# Patient Record
Sex: MALE | Race: WHITE | NOT HISPANIC OR LATINO | URBAN - METROPOLITAN AREA
[De-identification: names, ages, dates, MRNs, and addresses within clinical notes are randomized per-mention and may not be internally consistent; named-entity substitution may affect disease eponyms.]

---

## 2017-01-08 DIAGNOSIS — F20.0 SCHIZOPHRENIA, PARANOID TYPE (HCC): ICD-10-CM

## 2017-01-09 RX ORDER — BUPROPION HYDROCHLORIDE 75 MG/1
TABLET ORAL
Qty: 180 TABLET | Refills: 0 | Status: SHIPPED | OUTPATIENT
Start: 2017-01-09 | End: 2017-03-14 | Stop reason: SDUPTHER

## 2017-02-15 DIAGNOSIS — F20.0 SCHIZOPHRENIA, PARANOID TYPE (HCC): Primary | ICD-10-CM

## 2017-02-15 DIAGNOSIS — E66.1 MORBID DRUG-INDUCED OBESITY: ICD-10-CM

## 2017-02-15 DIAGNOSIS — I15.2 HYPERTENSION DUE TO ENDOCRINE DISORDER: ICD-10-CM

## 2017-02-16 ENCOUNTER — LAB (OUTPATIENT)
Dept: FAMILY MEDICINE CLINIC | Facility: CLINIC | Age: 37
End: 2017-02-16

## 2017-02-16 ENCOUNTER — RESULTS ENCOUNTER (OUTPATIENT)
Dept: FAMILY MEDICINE CLINIC | Facility: CLINIC | Age: 37
End: 2017-02-16

## 2017-02-16 DIAGNOSIS — F20.0 SCHIZOPHRENIA, PARANOID TYPE (HCC): ICD-10-CM

## 2017-02-16 DIAGNOSIS — I15.2 HYPERTENSION DUE TO ENDOCRINE DISORDER: ICD-10-CM

## 2017-02-17 LAB
ALBUMIN SERPL-MCNC: 4.2 G/DL (ref 3.5–5.2)
ALBUMIN/GLOB SERPL: 1.8 G/DL
ALP SERPL-CCNC: 63 U/L (ref 40–129)
ALT SERPL-CCNC: 21 U/L (ref 5–41)
AMPHETAMINES UR QL SCN: NEGATIVE NG/ML
APPEARANCE UR: CLEAR
AST SERPL-CCNC: 20 U/L (ref 5–40)
BARBITURATES UR QL SCN: NEGATIVE NG/ML
BENZODIAZ UR QL: NEGATIVE NG/ML
BILIRUB SERPL-MCNC: 0.2 MG/DL (ref 0.2–1.2)
BILIRUB UR QL STRIP: NEGATIVE
BUN SERPL-MCNC: 9 MG/DL (ref 6–20)
BUN/CREAT SERPL: 9.1 (ref 7–25)
BZE UR QL: NEGATIVE NG/ML
CALCIUM SERPL-MCNC: 9.7 MG/DL (ref 8.6–10.5)
CANNABINOIDS UR QL SCN: NEGATIVE NG/ML
CHLORIDE SERPL-SCNC: 99 MMOL/L (ref 98–107)
CHOLEST SERPL-MCNC: 159 MG/DL (ref 0–200)
CO2 SERPL-SCNC: 29.1 MMOL/L (ref 22–29)
COLOR UR: YELLOW
CREAT SERPL-MCNC: 0.99 MG/DL (ref 0.76–1.27)
ERYTHROCYTE [DISTWIDTH] IN BLOOD BY AUTOMATED COUNT: 12.2 % (ref 11.5–14.5)
GLOBULIN SER CALC-MCNC: 2.3 GM/DL
GLUCOSE SERPL-MCNC: 94 MG/DL (ref 65–99)
GLUCOSE UR QL: NEGATIVE
HCT VFR BLD AUTO: 46 % (ref 42–52)
HDLC SERPL-MCNC: 31 MG/DL (ref 40–60)
HGB BLD-MCNC: 15.1 G/DL (ref 14–18)
HGB UR QL STRIP: NEGATIVE
KETONES UR QL STRIP: NEGATIVE
LDLC SERPL CALC-MCNC: 80 MG/DL (ref 0–100)
LEUKOCYTE ESTERASE UR QL STRIP: NEGATIVE
MCH RBC QN AUTO: 27.8 PG (ref 27–31)
MCHC RBC AUTO-ENTMCNC: 32.8 G/DL (ref 31–37)
MCV RBC AUTO: 84.7 FL (ref 80–94)
NITRITE UR QL STRIP: NEGATIVE
OPIATES UR QL: NEGATIVE NG/ML
PCP UR QL: NEGATIVE NG/ML
PH UR STRIP: 7 [PH] (ref 4.5–8)
PLATELET # BLD AUTO: 343 10*3/MM3 (ref 140–500)
POTASSIUM SERPL-SCNC: 4.1 MMOL/L (ref 3.5–5.2)
PROT SERPL-MCNC: 6.5 G/DL (ref 6–8.5)
PROT UR QL STRIP: NEGATIVE
RBC # BLD AUTO: 5.43 10*6/MM3 (ref 4.7–6.1)
SODIUM SERPL-SCNC: 139 MMOL/L (ref 136–145)
SP GR UR: 1.01 (ref 1–1.03)
TRIGL SERPL-MCNC: 241 MG/DL (ref 0–150)
TSH SERPL DL<=0.005 MIU/L-ACNC: 1.48 MIU/ML (ref 0.27–4.2)
UROBILINOGEN UR STRIP-MCNC: NORMAL MG/DL
VLDLC SERPL CALC-MCNC: 48.2 MG/DL (ref 8–32)
WBC # BLD AUTO: 9.04 10*3/MM3 (ref 4.8–10.8)

## 2017-03-14 ENCOUNTER — OFFICE VISIT (OUTPATIENT)
Dept: FAMILY MEDICINE CLINIC | Facility: CLINIC | Age: 37
End: 2017-03-14

## 2017-03-14 VITALS
HEIGHT: 72 IN | WEIGHT: 303 LBS | OXYGEN SATURATION: 97 % | HEART RATE: 97 BPM | SYSTOLIC BLOOD PRESSURE: 102 MMHG | DIASTOLIC BLOOD PRESSURE: 74 MMHG | BODY MASS INDEX: 41.04 KG/M2 | TEMPERATURE: 98.2 F

## 2017-03-14 DIAGNOSIS — J06.9 ACUTE URI: ICD-10-CM

## 2017-03-14 DIAGNOSIS — F20.0 SCHIZOPHRENIA, PARANOID TYPE (HCC): Primary | ICD-10-CM

## 2017-03-14 PROCEDURE — 99213 OFFICE O/P EST LOW 20 MIN: CPT | Performed by: FAMILY MEDICINE

## 2017-03-14 RX ORDER — HYDROXYZINE 50 MG/1
25 TABLET, FILM COATED ORAL NIGHTLY PRN
Qty: 30 TABLET | Refills: 5 | Status: SHIPPED | OUTPATIENT
Start: 2017-03-14 | End: 2017-10-20 | Stop reason: SDUPTHER

## 2017-03-14 RX ORDER — AMOXICILLIN 500 MG/1
500 TABLET, FILM COATED ORAL 3 TIMES DAILY
Qty: 21 TABLET | Refills: 0 | Status: SHIPPED | OUTPATIENT
Start: 2017-03-14 | End: 2017-03-21

## 2017-03-14 RX ORDER — RISPERIDONE 4 MG/1
4 TABLET ORAL 2 TIMES DAILY
Qty: 60 TABLET | Refills: 5 | Status: SHIPPED | OUTPATIENT
Start: 2017-03-14 | End: 2017-08-28 | Stop reason: SDUPTHER

## 2017-03-14 RX ORDER — TRAZODONE HYDROCHLORIDE 100 MG/1
100 TABLET ORAL NIGHTLY
Qty: 30 TABLET | Refills: 5 | Status: SHIPPED | OUTPATIENT
Start: 2017-03-14 | End: 2017-11-07 | Stop reason: SDUPTHER

## 2017-03-14 RX ORDER — BUPROPION HYDROCHLORIDE 75 MG/1
75 TABLET ORAL 2 TIMES DAILY
Qty: 60 TABLET | Refills: 5 | Status: SHIPPED | OUTPATIENT
Start: 2017-03-14 | End: 2017-08-30 | Stop reason: SDUPTHER

## 2017-03-14 NOTE — PROGRESS NOTES
Subjective   Gibson Molina is a 36 y.o. male who is here for   Chief Complaint   Patient presents with   • Follow-up   • Nasal Congestion     x 4 days   .     History of Present Illness   Schizophrenia is stable  Lives with mother  No SI or HI  Meds ok  Not sleeping    Uri for last few weeks    The following portions of the patient's history were reviewed and updated as appropriate: allergies, current medications, past family history, past medical history, past social history, past surgical history and problem list.    Review of Systems    Objective   Physical Exam   Constitutional: He appears well-developed and well-nourished. No distress.   HENT:   Mouth/Throat: Oropharynx is clear and moist.   Cardiovascular: Normal rate.    Pulmonary/Chest: Effort normal.   Skin: He is not diaphoretic.   Psychiatric: His affect is labile. His speech is delayed. He is slowed. He expresses inappropriate judgment.   Nursing note and vitals reviewed.  cerumen impactions  Odd affect    Assessment/Plan   Gibson was seen today for follow-up and nasal congestion.    Diagnoses and all orders for this visit:    Schizophrenia, paranoid type  -     buPROPion (WELLBUTRIN) 75 MG tablet; Take 1 tablet by mouth 2 (Two) Times a Day.  -     hydrOXYzine (ATARAX) 50 MG tablet; Take 0.5 tablets by mouth At Night As Needed for Anxiety.  -     risperiDONE (risperDAL) 4 MG tablet; Take 1 tablet by mouth 2 (Two) Times a Day. Indications: Schizophrenia  -     traZODone (DESYREL) 100 MG tablet; Take 1 tablet by mouth Every Night. Indications: Trouble Sleeping  -     Ambulatory Referral to Psychiatry    Acute URI  -     amoxicillin (AMOXIL) 500 MG tablet; Take 1 tablet by mouth 3 (Three) Times a Day for 7 days.      Patient Instructions   S/w his mother  His insomia is most likely due to polypharmacy  So stop the atarax at night          Medications Discontinued During This Encounter   Medication Reason   • buPROPion (WELLBUTRIN) 75 MG tablet  Reorder   • hydrOXYzine (ATARAX) 50 MG tablet Reorder   • risperiDONE (RisperDAL) 4 MG tablet Reorder   • traZODone (DESYREL) 100 MG tablet Reorder        No Follow-up on file.    Dr. Jadiel Hawley  [place], [place]

## 2017-05-03 DIAGNOSIS — F20.0 SCHIZOPHRENIA, PARANOID TYPE (HCC): ICD-10-CM

## 2017-05-04 RX ORDER — TRAZODONE HYDROCHLORIDE 100 MG/1
TABLET ORAL
Qty: 90 TABLET | Refills: 1 | Status: SHIPPED | OUTPATIENT
Start: 2017-05-04 | End: 2017-11-07

## 2017-08-04 DIAGNOSIS — I15.2 HYPERTENSION DUE TO ENDOCRINE DISORDER: ICD-10-CM

## 2017-08-04 RX ORDER — HYDROCHLOROTHIAZIDE 50 MG/1
TABLET ORAL
Qty: 90 TABLET | Refills: 0 | Status: SHIPPED | OUTPATIENT
Start: 2017-08-04 | End: 2017-10-20 | Stop reason: SDUPTHER

## 2017-08-28 DIAGNOSIS — F20.0 SCHIZOPHRENIA, PARANOID TYPE (HCC): ICD-10-CM

## 2017-08-28 RX ORDER — RISPERIDONE 4 MG/1
TABLET ORAL
Qty: 60 TABLET | Refills: 0 | Status: SHIPPED | OUTPATIENT
Start: 2017-08-28 | End: 2017-08-30 | Stop reason: SDUPTHER

## 2017-08-30 DIAGNOSIS — F20.0 SCHIZOPHRENIA, PARANOID TYPE (HCC): ICD-10-CM

## 2017-08-30 RX ORDER — RISPERIDONE 4 MG/1
TABLET ORAL
Qty: 60 TABLET | Refills: 0 | Status: SHIPPED | OUTPATIENT
Start: 2017-08-30 | End: 2017-11-07 | Stop reason: SDUPTHER

## 2017-08-30 RX ORDER — BUPROPION HYDROCHLORIDE 75 MG/1
TABLET ORAL
Qty: 60 TABLET | Refills: 0 | Status: SHIPPED | OUTPATIENT
Start: 2017-08-30 | End: 2017-11-07 | Stop reason: SDUPTHER

## 2017-09-02 DIAGNOSIS — F20.0 SCHIZOPHRENIA, PARANOID TYPE (HCC): ICD-10-CM

## 2017-09-05 RX ORDER — BENZTROPINE MESYLATE 1 MG/1
TABLET ORAL
Qty: 180 TABLET | Refills: 0 | Status: SHIPPED | OUTPATIENT
Start: 2017-09-05 | End: 2017-09-15 | Stop reason: SDUPTHER

## 2017-09-14 ENCOUNTER — TELEPHONE (OUTPATIENT)
Dept: FAMILY MEDICINE CLINIC | Facility: CLINIC | Age: 37
End: 2017-09-14

## 2017-09-15 DIAGNOSIS — F20.0 SCHIZOPHRENIA, PARANOID TYPE (HCC): ICD-10-CM

## 2017-09-15 RX ORDER — BENZTROPINE MESYLATE 1 MG/1
1 TABLET ORAL 2 TIMES DAILY
Qty: 60 TABLET | Refills: 0 | Status: SHIPPED | OUTPATIENT
Start: 2017-09-15 | End: 2017-11-07 | Stop reason: SDUPTHER

## 2017-10-13 DIAGNOSIS — F20.0 SCHIZOPHRENIA, PARANOID TYPE (HCC): ICD-10-CM

## 2017-10-13 RX ORDER — BENZTROPINE MESYLATE 1 MG/1
TABLET ORAL
Qty: 60 TABLET | Refills: 0 | Status: SHIPPED | OUTPATIENT
Start: 2017-10-13 | End: 2017-11-07

## 2017-10-19 DIAGNOSIS — F20.0 SCHIZOPHRENIA, PARANOID TYPE (HCC): ICD-10-CM

## 2017-10-20 ENCOUNTER — TELEPHONE (OUTPATIENT)
Dept: FAMILY MEDICINE CLINIC | Facility: CLINIC | Age: 37
End: 2017-10-20

## 2017-10-20 DIAGNOSIS — F20.0 SCHIZOPHRENIA, PARANOID TYPE (HCC): ICD-10-CM

## 2017-10-20 DIAGNOSIS — I15.2 HYPERTENSION DUE TO ENDOCRINE DISORDER: ICD-10-CM

## 2017-10-20 RX ORDER — HYDROXYZINE 50 MG/1
TABLET, FILM COATED ORAL
Qty: 30 TABLET | Refills: 0 | Status: SHIPPED | OUTPATIENT
Start: 2017-10-20 | End: 2017-11-07 | Stop reason: SDUPTHER

## 2017-10-20 RX ORDER — BUPROPION HYDROCHLORIDE 75 MG/1
TABLET ORAL
Qty: 60 TABLET | Refills: 0 | Status: SHIPPED | OUTPATIENT
Start: 2017-10-20 | End: 2017-11-07

## 2017-10-20 RX ORDER — HYDROCHLOROTHIAZIDE 50 MG/1
TABLET ORAL
Qty: 90 TABLET | Refills: 0 | Status: SHIPPED | OUTPATIENT
Start: 2017-10-20 | End: 2017-11-07 | Stop reason: SDUPTHER

## 2017-10-20 RX ORDER — RISPERIDONE 4 MG/1
TABLET ORAL
Qty: 60 TABLET | Refills: 0 | Status: SHIPPED | OUTPATIENT
Start: 2017-10-20 | End: 2017-11-07

## 2017-10-20 NOTE — TELEPHONE ENCOUNTER
Call pharm to ok 1 month on all meds, ok early  Call his mother to make sure he comes in Nov for f/u  Has was a no show.

## 2017-11-07 ENCOUNTER — OFFICE VISIT (OUTPATIENT)
Dept: FAMILY MEDICINE CLINIC | Facility: CLINIC | Age: 37
End: 2017-11-07

## 2017-11-07 VITALS
HEIGHT: 72 IN | DIASTOLIC BLOOD PRESSURE: 70 MMHG | TEMPERATURE: 97.7 F | HEART RATE: 86 BPM | WEIGHT: 295 LBS | SYSTOLIC BLOOD PRESSURE: 108 MMHG | BODY MASS INDEX: 39.96 KG/M2 | OXYGEN SATURATION: 93 %

## 2017-11-07 DIAGNOSIS — F20.0 SCHIZOPHRENIA, PARANOID TYPE (HCC): ICD-10-CM

## 2017-11-07 DIAGNOSIS — Z00.00 ROUTINE ADULT HEALTH MAINTENANCE: ICD-10-CM

## 2017-11-07 DIAGNOSIS — Z23 NEED FOR INFLUENZA VACCINATION: Primary | ICD-10-CM

## 2017-11-07 DIAGNOSIS — I15.2 HYPERTENSION DUE TO ENDOCRINE DISORDER: ICD-10-CM

## 2017-11-07 PROCEDURE — 90686 IIV4 VACC NO PRSV 0.5 ML IM: CPT | Performed by: FAMILY MEDICINE

## 2017-11-07 PROCEDURE — 99214 OFFICE O/P EST MOD 30 MIN: CPT | Performed by: FAMILY MEDICINE

## 2017-11-07 PROCEDURE — 90471 IMMUNIZATION ADMIN: CPT | Performed by: FAMILY MEDICINE

## 2017-11-07 RX ORDER — TRAZODONE HYDROCHLORIDE 100 MG/1
100 TABLET ORAL NIGHTLY
Qty: 30 TABLET | Refills: 5 | Status: SHIPPED | OUTPATIENT
Start: 2017-11-07 | End: 2018-04-27 | Stop reason: SDUPTHER

## 2017-11-07 RX ORDER — RISPERIDONE 4 MG/1
4 TABLET ORAL 2 TIMES DAILY
Qty: 60 TABLET | Refills: 5 | Status: SHIPPED | OUTPATIENT
Start: 2017-11-07 | End: 2018-05-18 | Stop reason: SDUPTHER

## 2017-11-07 RX ORDER — BENZTROPINE MESYLATE 1 MG/1
1 TABLET ORAL 2 TIMES DAILY
Qty: 60 TABLET | Refills: 5 | Status: SHIPPED | OUTPATIENT
Start: 2017-11-07 | End: 2018-04-21 | Stop reason: SDUPTHER

## 2017-11-07 RX ORDER — HYDROCHLOROTHIAZIDE 50 MG/1
50 TABLET ORAL DAILY
Qty: 90 TABLET | Refills: 3 | Status: SHIPPED | OUTPATIENT
Start: 2017-11-07 | End: 2018-04-23 | Stop reason: SDUPTHER

## 2017-11-07 RX ORDER — HYDROXYZINE 50 MG/1
50 TABLET, FILM COATED ORAL EVERY 6 HOURS PRN
Qty: 30 TABLET | Refills: 5 | Status: SHIPPED | OUTPATIENT
Start: 2017-11-07 | End: 2018-07-12 | Stop reason: SDUPTHER

## 2017-11-07 RX ORDER — BUPROPION HYDROCHLORIDE 75 MG/1
75 TABLET ORAL 2 TIMES DAILY
Qty: 60 TABLET | Refills: 5 | Status: SHIPPED | OUTPATIENT
Start: 2017-11-07 | End: 2018-05-14 | Stop reason: SDUPTHER

## 2017-11-07 NOTE — PATIENT INSTRUCTIONS
Results for orders placed or performed in visit on 02/16/17   Lipid Panel   Result Value Ref Range    Total Cholesterol 159 0 - 200 mg/dL    Triglycerides 241 (H) 0 - 150 mg/dL    HDL Cholesterol 31 (L) 40 - 60 mg/dL    VLDL Cholesterol 48.2 (H) 8 - 32 mg/dL    LDL Cholesterol  80 0 - 100 mg/dL   CBC (No Diff)   Result Value Ref Range    WBC 9.04 4.80 - 10.80 10*3/mm3    RBC 5.43 4.70 - 6.10 10*6/mm3    Hemoglobin 15.1 14.0 - 18.0 g/dL    Hematocrit 46.0 42.0 - 52.0 %    MCV 84.7 80.0 - 94.0 fL    MCH 27.8 27.0 - 31.0 pg    MCHC 32.8 31.0 - 37.0 g/dL    RDW 12.2 11.5 - 14.5 %    Platelets 343 140 - 500 10*3/mm3   Comprehensive Metabolic Panel   Result Value Ref Range    Glucose 94 65 - 99 mg/dL    BUN 9 6 - 20 mg/dL    Creatinine 0.99 0.76 - 1.27 mg/dL    eGFR Non African Am 87 >60 mL/min/1.73    eGFR African Am 105 >60 mL/min/1.73    BUN/Creatinine Ratio 9.1 7.0 - 25.0    Sodium 139 136 - 145 mmol/L    Potassium 4.1 3.5 - 5.2 mmol/L    Chloride 99 98 - 107 mmol/L    Total CO2 29.1 (H) 22.0 - 29.0 mmol/L    Calcium 9.7 8.6 - 10.5 mg/dL    Total Protein 6.5 6.0 - 8.5 g/dL    Albumin 4.20 3.50 - 5.20 g/dL    Globulin 2.3 gm/dL    A/G Ratio 1.8 g/dL    Total Bilirubin 0.2 0.2 - 1.2 mg/dL    Alkaline Phosphatase 63 40 - 129 U/L    AST (SGOT) 20 5 - 40 U/L    ALT (SGPT) 21 5 - 41 U/L   TSH   Result Value Ref Range    TSH 1.480 0.270 - 4.200 mIU/mL   Urinalysis With / Microscopic If Indicated   Result Value Ref Range    Specific Gravity, UA 1.015 1.003 - 1.030    pH, UA 7.0 4.5 - 8.0    Color, UA Yellow     Appearance, UA Clear Clear    Leukocytes, UA Negative Negative    Protein Negative Negative    Glucose, UA Negative Negative    Ketones Negative     Blood, UA Negative Negative    Bilirubin, UA Negative Negative    Urobilinogen, UA Comment     Nitrite, UA Negative Negative   [id number] Drug-Unbund   Result Value Ref Range    Amphetamine, Urine Qual Negative Agrngf=6809 ng/mL    Barbiturates Screen, Urine Negative  Mydnkt=502 ng/mL    Benzodiazepine Screen, Urine Negative Ylqyzo=170 ng/mL    THC Screen, Urine Negative Cutoff=50 ng/mL    Cocaine Screen, Urine Negative Kloioc=528 ng/mL    Opiate Screen, Urine Negative Qdtgix=467 ng/mL    Phencyclidine (PCP), Urine Negative Cutoff=25 ng/mL

## 2017-11-07 NOTE — PROGRESS NOTES
Subjective   Gibson Molina is a 37 y.o. male who is here for   Chief Complaint   Patient presents with   • Follow-up   • Anxiety   • Depression   • Schizophrenia   .     History of Present Illness   Depression: Patient complains of depression. He complains of anhedonia, depressed mood, difficulty concentrating, fatigue, impaired memory and psychomotor agitation. Onset was approximately several years ago, unchanged since that time.  He denies current suicidal and homicidal plan or intent.   Family history significant for no psychiatric illness.Possible organic causes contributing are: drug abuse.  Risk factors: none Previous treatment includes Wellbutrin and risperdal and group therapy, individual therapy and medication. He complains of the following side effects from the treatment: dry mouth and weight gain.  Schizophrenia has been stable  Lives with parents, mother and step father  Stays at home most of time.  No night [place]  No voices.  No SI nor HI    The following portions of the patient's history were reviewed and updated as appropriate: allergies, current medications, past family history, past medical history, past social history, past surgical history and problem list.    Review of Systems    Objective   Physical Exam   Constitutional: No distress.   Cardiovascular: Normal rate.    Pulmonary/Chest: Effort normal.   Neurological: He is alert.   Psychiatric: His mood appears anxious. His speech is slurred. He is hyperactive. He is not actively hallucinating. Thought content is paranoid. Cognition and memory are impaired. He expresses impulsivity and inappropriate judgment.   Nursing note and vitals reviewed.      Assessment/Plan   Gibson was seen today for follow-up, anxiety, depression and schizophrenia.    Diagnoses and all orders for this visit:    Need for influenza vaccination  -     Flu Vaccine Quad PF 3YR+    Schizophrenia, paranoid type  -     benztropine (COGENTIN) 1 MG tablet; Take 1 tablet by  mouth 2 (Two) Times a Day. Indications: Extrapyramidal Reaction caused by Medications  -     buPROPion (WELLBUTRIN) 75 MG tablet; Take 1 tablet by mouth 2 (Two) Times a Day. Indications: Depressive Phase of Manic-Depression  -     hydrOXYzine (ATARAX) 50 MG tablet; Take 1 tablet by mouth Every 6 (Six) Hours As Needed for Anxiety.  -     risperiDONE (risperDAL) 4 MG tablet; Take 1 tablet by mouth 2 (Two) Times a Day. Indications: Schizophrenia  -     traZODone (DESYREL) 100 MG tablet; Take 1 tablet by mouth Every Night. Indications: Trouble Sleeping  -     Lipid Panel; Future  -     CBC (No Diff); Future  -     Comprehensive Metabolic Panel; Future  -     TSH; Future  -     Urine Drug Screen - Urine, Clean Catch; Future  -     Hemoglobin A1c; Future    Hypertension due to endocrine disorder  -     hydrochlorothiazide (HYDRODIURIL) 50 MG tablet; Take 1 tablet by mouth Daily.  -     Lipid Panel; Future  -     CBC (No Diff); Future  -     Comprehensive Metabolic Panel; Future  -     TSH; Future  -     Urine Drug Screen - Urine, Clean Catch; Future  -     Hemoglobin A1c; Future    Routine adult health maintenance  -     Lipid Panel; Future  -     CBC (No Diff); Future  -     Comprehensive Metabolic Panel; Future  -     TSH; Future  -     Urine Drug Screen - Urine, Clean Catch; Future  -     Hemoglobin A1c; Future      Patient Instructions     Results for orders placed or performed in visit on 02/16/17   Lipid Panel   Result Value Ref Range    Total Cholesterol 159 0 - 200 mg/dL    Triglycerides 241 (H) 0 - 150 mg/dL    HDL Cholesterol 31 (L) 40 - 60 mg/dL    VLDL Cholesterol 48.2 (H) 8 - 32 mg/dL    LDL Cholesterol  80 0 - 100 mg/dL   CBC (No Diff)   Result Value Ref Range    WBC 9.04 4.80 - 10.80 10*3/mm3    RBC 5.43 4.70 - 6.10 10*6/mm3    Hemoglobin 15.1 14.0 - 18.0 g/dL    Hematocrit 46.0 42.0 - 52.0 %    MCV 84.7 80.0 - 94.0 fL    MCH 27.8 27.0 - 31.0 pg    MCHC 32.8 31.0 - 37.0 g/dL    RDW 12.2 11.5 - 14.5 %     Platelets 343 140 - 500 10*3/mm3   Comprehensive Metabolic Panel   Result Value Ref Range    Glucose 94 65 - 99 mg/dL    BUN 9 6 - 20 mg/dL    Creatinine 0.99 0.76 - 1.27 mg/dL    eGFR Non African Am 87 >60 mL/min/1.73    eGFR African Am 105 >60 mL/min/1.73    BUN/Creatinine Ratio 9.1 7.0 - 25.0    Sodium 139 136 - 145 mmol/L    Potassium 4.1 3.5 - 5.2 mmol/L    Chloride 99 98 - 107 mmol/L    Total CO2 29.1 (H) 22.0 - 29.0 mmol/L    Calcium 9.7 8.6 - 10.5 mg/dL    Total Protein 6.5 6.0 - 8.5 g/dL    Albumin 4.20 3.50 - 5.20 g/dL    Globulin 2.3 gm/dL    A/G Ratio 1.8 g/dL    Total Bilirubin 0.2 0.2 - 1.2 mg/dL    Alkaline Phosphatase 63 40 - 129 U/L    AST (SGOT) 20 5 - 40 U/L    ALT (SGPT) 21 5 - 41 U/L   TSH   Result Value Ref Range    TSH 1.480 0.270 - 4.200 mIU/mL   Urinalysis With / Microscopic If Indicated   Result Value Ref Range    Specific Gravity, UA 1.015 1.003 - 1.030    pH, UA 7.0 4.5 - 8.0    Color, UA Yellow     Appearance, UA Clear Clear    Leukocytes, UA Negative Negative    Protein Negative Negative    Glucose, UA Negative Negative    Ketones Negative     Blood, UA Negative Negative    Bilirubin, UA Negative Negative    Urobilinogen, UA Comment     Nitrite, UA Negative Negative   [id number] Drug-Unbund   Result Value Ref Range    Amphetamine, Urine Qual Negative Ljsdmv=0317 ng/mL    Barbiturates Screen, Urine Negative Ugewuh=916 ng/mL    Benzodiazepine Screen, Urine Negative Wibzvx=001 ng/mL    THC Screen, Urine Negative Cutoff=50 ng/mL    Cocaine Screen, Urine Negative Eovrau=190 ng/mL    Opiate Screen, Urine Negative Pqxvbf=401 ng/mL    Phencyclidine (PCP), Urine Negative Cutoff=25 ng/mL           Medications Discontinued During This Encounter   Medication Reason   • benztropine (COGENTIN) 1 MG tablet Therapy completed   • buPROPion (WELLBUTRIN) 75 MG tablet Therapy completed   • risperiDONE (risperDAL) 4 MG tablet Therapy completed   • traZODone (DESYREL) 100 MG tablet Therapy completed   •  terbinafine (LamiSIL) 250 MG tablet Therapy completed   • benztropine (COGENTIN) 1 MG tablet Reorder   • buPROPion (WELLBUTRIN) 75 MG tablet Reorder   • hydrochlorothiazide (HYDRODIURIL) 50 MG tablet Reorder   • hydrOXYzine (ATARAX) 50 MG tablet Reorder   • risperiDONE (risperDAL) 4 MG tablet Reorder   • traZODone (DESYREL) 100 MG tablet Reorder        Return in about 6 months (around 5/7/2018) for Annual physical.    Dr. Jadiel Hawley  [place], [place]

## 2017-11-29 DIAGNOSIS — F20.0 SCHIZOPHRENIA, PARANOID TYPE (HCC): ICD-10-CM

## 2017-11-29 RX ORDER — RISPERIDONE 4 MG/1
TABLET ORAL
Qty: 60 TABLET | Refills: 0 | Status: SHIPPED | OUTPATIENT
Start: 2017-11-29 | End: 2018-06-12 | Stop reason: SDUPTHER

## 2018-01-15 DIAGNOSIS — I15.2 HYPERTENSION DUE TO ENDOCRINE DISORDER: ICD-10-CM

## 2018-01-16 RX ORDER — HYDROCHLOROTHIAZIDE 50 MG/1
TABLET ORAL
Qty: 90 TABLET | Refills: 0 | Status: SHIPPED | OUTPATIENT
Start: 2018-01-16 | End: 2018-07-12 | Stop reason: SDUPTHER

## 2018-02-15 ENCOUNTER — TELEPHONE (OUTPATIENT)
Dept: FAMILY MEDICINE CLINIC | Facility: CLINIC | Age: 38
End: 2018-02-15

## 2018-02-23 DIAGNOSIS — F20.0 SCHIZOPHRENIA, PARANOID TYPE (HCC): ICD-10-CM

## 2018-02-23 RX ORDER — HYDROXYZINE 50 MG/1
TABLET, FILM COATED ORAL
Qty: 30 TABLET | Refills: 0 | Status: SHIPPED | OUTPATIENT
Start: 2018-02-23 | End: 2018-04-02 | Stop reason: SDUPTHER

## 2018-04-02 DIAGNOSIS — F20.0 SCHIZOPHRENIA, PARANOID TYPE (HCC): ICD-10-CM

## 2018-04-03 RX ORDER — HYDROXYZINE 50 MG/1
TABLET, FILM COATED ORAL
Qty: 30 TABLET | Refills: 0 | Status: SHIPPED | OUTPATIENT
Start: 2018-04-03 | End: 2018-07-12 | Stop reason: DRUGHIGH

## 2018-04-07 ENCOUNTER — RESULTS ENCOUNTER (OUTPATIENT)
Dept: FAMILY MEDICINE CLINIC | Facility: CLINIC | Age: 38
End: 2018-04-07

## 2018-04-07 DIAGNOSIS — Z00.00 ROUTINE ADULT HEALTH MAINTENANCE: ICD-10-CM

## 2018-04-07 DIAGNOSIS — I15.2 HYPERTENSION DUE TO ENDOCRINE DISORDER: ICD-10-CM

## 2018-04-07 DIAGNOSIS — F20.0 SCHIZOPHRENIA, PARANOID TYPE (HCC): ICD-10-CM

## 2018-04-21 DIAGNOSIS — F20.0 SCHIZOPHRENIA, PARANOID TYPE (HCC): ICD-10-CM

## 2018-04-23 DIAGNOSIS — I15.2 HYPERTENSION DUE TO ENDOCRINE DISORDER: ICD-10-CM

## 2018-04-23 RX ORDER — HYDROCHLOROTHIAZIDE 50 MG/1
50 TABLET ORAL DAILY
Qty: 90 TABLET | Refills: 0 | Status: SHIPPED | OUTPATIENT
Start: 2018-04-23 | End: 2018-07-12 | Stop reason: SDUPTHER

## 2018-04-23 RX ORDER — BENZTROPINE MESYLATE 1 MG/1
TABLET ORAL
Qty: 60 TABLET | Refills: 0 | Status: SHIPPED | OUTPATIENT
Start: 2018-04-23 | End: 2018-05-26 | Stop reason: SDUPTHER

## 2018-04-27 DIAGNOSIS — F20.0 SCHIZOPHRENIA, PARANOID TYPE (HCC): ICD-10-CM

## 2018-04-27 RX ORDER — TRAZODONE HYDROCHLORIDE 100 MG/1
TABLET ORAL
Qty: 30 TABLET | Refills: 1 | Status: SHIPPED | OUTPATIENT
Start: 2018-04-27 | End: 2018-05-26 | Stop reason: SDUPTHER

## 2018-05-14 DIAGNOSIS — F20.0 SCHIZOPHRENIA, PARANOID TYPE (HCC): ICD-10-CM

## 2018-05-14 RX ORDER — BUPROPION HYDROCHLORIDE 75 MG/1
TABLET ORAL
Qty: 60 TABLET | Refills: 0 | Status: SHIPPED | OUTPATIENT
Start: 2018-05-14 | End: 2018-06-16 | Stop reason: SDUPTHER

## 2018-05-18 DIAGNOSIS — F20.0 SCHIZOPHRENIA, PARANOID TYPE (HCC): ICD-10-CM

## 2018-05-18 RX ORDER — RISPERIDONE 4 MG/1
TABLET ORAL
Qty: 60 TABLET | Refills: 0 | Status: SHIPPED | OUTPATIENT
Start: 2018-05-18 | End: 2018-07-07 | Stop reason: SDUPTHER

## 2018-05-26 DIAGNOSIS — F20.0 SCHIZOPHRENIA, PARANOID TYPE (HCC): ICD-10-CM

## 2018-05-29 RX ORDER — BENZTROPINE MESYLATE 1 MG/1
TABLET ORAL
Qty: 60 TABLET | Refills: 0 | Status: SHIPPED | OUTPATIENT
Start: 2018-05-29 | End: 2018-06-24 | Stop reason: SDUPTHER

## 2018-05-29 RX ORDER — TRAZODONE HYDROCHLORIDE 100 MG/1
TABLET ORAL
Qty: 30 TABLET | Refills: 0 | Status: SHIPPED | OUTPATIENT
Start: 2018-05-29 | End: 2018-07-12 | Stop reason: SDUPTHER

## 2018-06-12 DIAGNOSIS — F20.0 SCHIZOPHRENIA, PARANOID TYPE (HCC): ICD-10-CM

## 2018-06-12 RX ORDER — RISPERIDONE 4 MG/1
TABLET ORAL
Qty: 30 TABLET | Refills: 0 | Status: SHIPPED | OUTPATIENT
Start: 2018-06-12 | End: 2018-07-12 | Stop reason: SDUPTHER

## 2018-06-16 DIAGNOSIS — F20.0 SCHIZOPHRENIA, PARANOID TYPE (HCC): ICD-10-CM

## 2018-06-18 RX ORDER — BUPROPION HYDROCHLORIDE 75 MG/1
TABLET ORAL
Qty: 30 TABLET | Refills: 0 | Status: SHIPPED | OUTPATIENT
Start: 2018-06-18 | End: 2018-07-12

## 2018-06-24 DIAGNOSIS — F20.0 SCHIZOPHRENIA, PARANOID TYPE (HCC): ICD-10-CM

## 2018-06-25 RX ORDER — BENZTROPINE MESYLATE 1 MG/1
TABLET ORAL
Qty: 30 TABLET | Refills: 0 | Status: SHIPPED | OUTPATIENT
Start: 2018-06-25 | End: 2018-07-12 | Stop reason: SDUPTHER

## 2018-07-07 DIAGNOSIS — F20.0 SCHIZOPHRENIA, PARANOID TYPE (HCC): ICD-10-CM

## 2018-07-09 DIAGNOSIS — F20.0 SCHIZOPHRENIA, PARANOID TYPE (HCC): ICD-10-CM

## 2018-07-09 RX ORDER — RISPERIDONE 4 MG/1
TABLET ORAL
Qty: 60 TABLET | Refills: 0 | Status: SHIPPED | OUTPATIENT
Start: 2018-07-09 | End: 2018-07-12 | Stop reason: SDUPTHER

## 2018-07-10 RX ORDER — TRAZODONE HYDROCHLORIDE 100 MG/1
TABLET ORAL
Qty: 30 TABLET | Refills: 0 | Status: SHIPPED | OUTPATIENT
Start: 2018-07-10 | End: 2018-07-12 | Stop reason: SDUPTHER

## 2018-07-12 ENCOUNTER — OFFICE VISIT (OUTPATIENT)
Dept: FAMILY MEDICINE CLINIC | Facility: CLINIC | Age: 38
End: 2018-07-12

## 2018-07-12 VITALS
HEIGHT: 72 IN | WEIGHT: 267 LBS | SYSTOLIC BLOOD PRESSURE: 130 MMHG | DIASTOLIC BLOOD PRESSURE: 80 MMHG | OXYGEN SATURATION: 97 % | BODY MASS INDEX: 36.16 KG/M2 | HEART RATE: 97 BPM

## 2018-07-12 DIAGNOSIS — I15.2 HYPERTENSION DUE TO ENDOCRINE DISORDER: ICD-10-CM

## 2018-07-12 DIAGNOSIS — F20.0 SCHIZOPHRENIA, PARANOID TYPE (HCC): Primary | ICD-10-CM

## 2018-07-12 PROCEDURE — 99214 OFFICE O/P EST MOD 30 MIN: CPT | Performed by: FAMILY MEDICINE

## 2018-07-12 RX ORDER — BENZTROPINE MESYLATE 1 MG/1
1 TABLET ORAL 2 TIMES DAILY
Qty: 60 TABLET | Refills: 11 | Status: SHIPPED | OUTPATIENT
Start: 2018-07-12 | End: 2019-10-28

## 2018-07-12 RX ORDER — HYDROXYZINE 50 MG/1
50 TABLET, FILM COATED ORAL EVERY 6 HOURS PRN
Qty: 30 TABLET | Refills: 5 | Status: SHIPPED | OUTPATIENT
Start: 2018-07-12 | End: 2019-03-11 | Stop reason: SDUPTHER

## 2018-07-12 RX ORDER — HYDROCHLOROTHIAZIDE 50 MG/1
50 TABLET ORAL DAILY
Qty: 90 TABLET | Refills: 3 | Status: SHIPPED | OUTPATIENT
Start: 2018-07-12 | End: 2019-10-28

## 2018-07-12 RX ORDER — RISPERIDONE 4 MG/1
4 TABLET ORAL 2 TIMES DAILY
Qty: 60 TABLET | Refills: 11 | Status: SHIPPED | OUTPATIENT
Start: 2018-07-12 | End: 2019-07-25 | Stop reason: SDUPTHER

## 2018-07-12 RX ORDER — TRAZODONE HYDROCHLORIDE 100 MG/1
100 TABLET ORAL
Qty: 30 TABLET | Refills: 11 | Status: SHIPPED | OUTPATIENT
Start: 2018-07-12 | End: 2018-09-10 | Stop reason: SDUPTHER

## 2018-07-12 NOTE — PROGRESS NOTES
Chief Complaint   Patient presents with   • Follow-up   • Hypertension   • Schizophrenia       Subjective     Patient here for follow-up of elevated blood pressure.    He is not exercising and is adherent to a low-salt diet.    Blood pressure is well controlled at home.   Cardiac symptoms: none.   Patient denies: chest pressure/discomfort, claudication, cough, dyspnea, exertional chest pressure/discomfort, fatigue, irregular heart beat, lower extremity edema, near-syncope, orthopnea, palpitations, paroxysmal nocturnal dyspnea and syncope.   Cardiovascular risk factors: hypertension, male gender and sedentary lifestyle.   Use of agents associated with hypertension: none.   History of target organ damage: none.  Patient is taking prescribed hypertension medications as prescribed without side effects.    Schizophrenia is stable  No idris, no depression,  Sleep is stable  No drugs  No voices  But stress at home his mother has metastatic breast cancer    The following portions of the patient's history were reviewed and updated as appropriate: allergies, current medications, past family history, past medical history, past social history, past surgical history and problem list.    Review of Systems  A comprehensive review of systems was negative except for: Behavioral/Psych: positive for aggressive behavior, bad mood, behavior problems, learning difficulty, mood swings, sleep disturbance and tobacco use    Results for orders placed or performed in visit on 02/16/17   Lipid Panel   Result Value Ref Range    Total Cholesterol 159 0 - 200 mg/dL    Triglycerides 241 (H) 0 - 150 mg/dL    HDL Cholesterol 31 (L) 40 - 60 mg/dL    VLDL Cholesterol 48.2 (H) 8 - 32 mg/dL    LDL Cholesterol  80 0 - 100 mg/dL   CBC (No Diff)   Result Value Ref Range    WBC 9.04 4.80 - 10.80 10*3/mm3    RBC 5.43 4.70 - 6.10 10*6/mm3    Hemoglobin 15.1 14.0 - 18.0 g/dL    Hematocrit 46.0 42.0 - 52.0 %    MCV 84.7 80.0 - 94.0 fL    MCH 27.8 27.0 - 31.0  "pg    MCHC 32.8 31.0 - 37.0 g/dL    RDW 12.2 11.5 - 14.5 %    Platelets 343 140 - 500 10*3/mm3   Comprehensive Metabolic Panel   Result Value Ref Range    Glucose 94 65 - 99 mg/dL    BUN 9 6 - 20 mg/dL    Creatinine 0.99 0.76 - 1.27 mg/dL    eGFR Non African Am 87 >60 mL/min/1.73    eGFR African Am 105 >60 mL/min/1.73    BUN/Creatinine Ratio 9.1 7.0 - 25.0    Sodium 139 136 - 145 mmol/L    Potassium 4.1 3.5 - 5.2 mmol/L    Chloride 99 98 - 107 mmol/L    Total CO2 29.1 (H) 22.0 - 29.0 mmol/L    Calcium 9.7 8.6 - 10.5 mg/dL    Total Protein 6.5 6.0 - 8.5 g/dL    Albumin 4.20 3.50 - 5.20 g/dL    Globulin 2.3 gm/dL    A/G Ratio 1.8 g/dL    Total Bilirubin 0.2 0.2 - 1.2 mg/dL    Alkaline Phosphatase 63 40 - 129 U/L    AST (SGOT) 20 5 - 40 U/L    ALT (SGPT) 21 5 - 41 U/L   TSH   Result Value Ref Range    TSH 1.480 0.270 - 4.200 mIU/mL   Urinalysis With / Microscopic If Indicated   Result Value Ref Range    Specific Gravity, UA 1.015 1.003 - 1.030    pH, UA 7.0 4.5 - 8.0    Color, UA Yellow     Appearance, UA Clear Clear    Leukocytes, UA Negative Negative    Protein Negative Negative    Glucose, UA Negative Negative    Ketones Negative     Blood, UA Negative Negative    Bilirubin, UA Negative Negative    Urobilinogen, UA Comment     Nitrite, UA Negative Negative   [id number] Drug-Unbund   Result Value Ref Range    Amphetamine, Urine Qual Negative Xwxhnh=0786 ng/mL    Barbiturates Screen, Urine Negative Pcakou=736 ng/mL    Benzodiazepine Screen, Urine Negative Wzvpta=337 ng/mL    THC Screen, Urine Negative Cutoff=50 ng/mL    Cocaine Screen, Urine Negative Hdwpjb=888 ng/mL    Opiate Screen, Urine Negative Ovlhlu=584 ng/mL    Phencyclidine (PCP), Urine Negative Cutoff=25 ng/mL        Vitals:    07/12/18 1039   BP: 130/80   BP Location: Left arm   Patient Position: Sitting   Pulse: 97   SpO2: 97%   Weight: 121 kg (267 lb)   Height: 182.9 cm (72\")     Objective    Gen: alert, pleasant.  HEENT: PERRL, EOMI intact, lids ok, ear " canals clear, TMs normal, throat clear, nostrils normal  Neck: no bruit, no enlarged thyroid  Lungs: CTA  Heart: RR no murmur  ABD: soft , + BS  Pulses: intact  Mood: stable     Assessment/Plan   Hypertension, normal blood pressure Evidence of target organ damage: none.    Gibson was seen today for follow-up, hypertension and schizophrenia.    Diagnoses and all orders for this visit:    Schizophrenia, paranoid type (CMS/HCC)  -     benztropine (COGENTIN) 1 MG tablet; Take 1 tablet by mouth 2 (Two) Times a Day.  -     risperiDONE (risperDAL) 4 MG tablet; Take 1 tablet by mouth 2 (Two) Times a Day.  -     traZODone (DESYREL) 100 MG tablet; Take 1 tablet by mouth every night at bedtime.  -     hydrOXYzine (ATARAX) 50 MG tablet; Take 1 tablet by mouth Every 6 (Six) Hours As Needed for Anxiety.    Hypertension due to endocrine disorder  -     hydrochlorothiazide (HYDRODIURIL) 50 MG tablet; Take 1 tablet by mouth Daily.      Medication: no change.  Follow up: 6 months and as needed.    There are no Patient Instructions on file for this visit.  Medications Discontinued During This Encounter   Medication Reason   • traZODone (DESYREL) 100 MG tablet Duplicate order   • risperiDONE (risperDAL) 4 MG tablet Duplicate order   • hydrOXYzine (ATARAX) 50 MG tablet Dose adjustment   • hydrochlorothiazide (HYDRODIURIL) 50 MG tablet Duplicate order   • buPROPion (WELLBUTRIN) 75 MG tablet *Therapy completed   • benztropine (COGENTIN) 1 MG tablet Reorder   • hydrochlorothiazide (HYDRODIURIL) 50 MG tablet Reorder   • risperiDONE (risperDAL) 4 MG tablet Reorder   • traZODone (DESYREL) 100 MG tablet Reorder   • hydrOXYzine (ATARAX) 50 MG tablet Reorder        Return in about 6 months (around 1/12/2019) for blood pressure.    Limit salt    Limit caffeine to 1-2 servings a day    Dr. Jadiel Hawley MD  [place], [place]  [facility].

## 2018-08-05 DIAGNOSIS — F20.0 SCHIZOPHRENIA, PARANOID TYPE (HCC): ICD-10-CM

## 2018-08-06 RX ORDER — TRAZODONE HYDROCHLORIDE 100 MG/1
TABLET ORAL
Qty: 30 TABLET | Refills: 0 | Status: SHIPPED | OUTPATIENT
Start: 2018-08-06 | End: 2018-09-10 | Stop reason: SDUPTHER

## 2018-09-05 LAB
ALBUMIN SERPL-MCNC: 4.2 G/DL (ref 3.5–5.2)
ALBUMIN/GLOB SERPL: 1.8 G/DL
ALP SERPL-CCNC: 56 U/L (ref 40–129)
ALT SERPL-CCNC: 29 U/L (ref 5–41)
AST SERPL-CCNC: 23 U/L (ref 5–40)
BILIRUB SERPL-MCNC: 0.6 MG/DL (ref 0.2–1.2)
BUN SERPL-MCNC: 12 MG/DL (ref 6–20)
BUN/CREAT SERPL: 13.5 (ref 7–25)
CALCIUM SERPL-MCNC: 9.2 MG/DL (ref 8.6–10.5)
CHLORIDE SERPL-SCNC: 100 MMOL/L (ref 98–107)
CHOLEST SERPL-MCNC: 150 MG/DL (ref 0–200)
CO2 SERPL-SCNC: 25.8 MMOL/L (ref 22–29)
CREAT SERPL-MCNC: 0.89 MG/DL (ref 0.76–1.27)
ERYTHROCYTE [DISTWIDTH] IN BLOOD BY AUTOMATED COUNT: 12.1 % (ref 11.5–14.5)
GLOBULIN SER CALC-MCNC: 2.4 GM/DL
GLUCOSE SERPL-MCNC: 102 MG/DL (ref 65–99)
HBA1C MFR BLD: 5.3 % (ref 4.8–5.6)
HCT VFR BLD AUTO: 46.9 % (ref 42–52)
HDLC SERPL-MCNC: 32 MG/DL (ref 40–60)
HGB BLD-MCNC: 15.5 G/DL (ref 14–18)
LDLC SERPL CALC-MCNC: 80 MG/DL (ref 0–100)
MCH RBC QN AUTO: 28.4 PG (ref 27–31)
MCHC RBC AUTO-ENTMCNC: 33 G/DL (ref 31–37)
MCV RBC AUTO: 86.1 FL (ref 80–94)
PLATELET # BLD AUTO: 289 10*3/MM3 (ref 140–500)
POTASSIUM SERPL-SCNC: 4.1 MMOL/L (ref 3.5–5.2)
PROT SERPL-MCNC: 6.6 G/DL (ref 6–8.5)
RBC # BLD AUTO: 5.45 10*6/MM3 (ref 4.7–6.1)
SODIUM SERPL-SCNC: 139 MMOL/L (ref 136–145)
TRIGL SERPL-MCNC: 191 MG/DL (ref 0–150)
TSH SERPL DL<=0.005 MIU/L-ACNC: 1.18 MIU/ML (ref 0.27–4.2)
VLDLC SERPL CALC-MCNC: 38.2 MG/DL (ref 8–32)
WBC # BLD AUTO: 9.96 10*3/MM3 (ref 4.8–10.8)

## 2018-09-10 ENCOUNTER — OFFICE VISIT (OUTPATIENT)
Dept: FAMILY MEDICINE CLINIC | Facility: CLINIC | Age: 38
End: 2018-09-10

## 2018-09-10 VITALS
DIASTOLIC BLOOD PRESSURE: 84 MMHG | BODY MASS INDEX: 38.19 KG/M2 | SYSTOLIC BLOOD PRESSURE: 124 MMHG | HEART RATE: 85 BPM | OXYGEN SATURATION: 96 % | HEIGHT: 72 IN | WEIGHT: 282 LBS

## 2018-09-10 DIAGNOSIS — E66.1 MORBID DRUG-INDUCED OBESITY: ICD-10-CM

## 2018-09-10 DIAGNOSIS — F20.0 SCHIZOPHRENIA, PARANOID TYPE (HCC): ICD-10-CM

## 2018-09-10 DIAGNOSIS — I15.2 HYPERTENSION DUE TO ENDOCRINE DISORDER: Primary | ICD-10-CM

## 2018-09-10 DIAGNOSIS — Z00.00 ROUTINE ADULT HEALTH MAINTENANCE: ICD-10-CM

## 2018-09-10 PROCEDURE — 99395 PREV VISIT EST AGE 18-39: CPT | Performed by: FAMILY MEDICINE

## 2018-09-10 RX ORDER — TRAZODONE HYDROCHLORIDE 100 MG/1
100 TABLET ORAL
Qty: 90 TABLET | Refills: 3 | Status: SHIPPED | OUTPATIENT
Start: 2018-09-10 | End: 2019-09-26 | Stop reason: SDUPTHER

## 2018-09-10 NOTE — PROGRESS NOTES
"  Chief Complaint   Patient presents with   • Annual Exam       Subjective   Gibson Molina is a 38 y.o. male and is here for a yearly physical exam. The patient reports no problems.    Do you take any herbs or supplements that were not prescribed by a doctor? no. If so, these will be added to active medication list.    The following portions of the patient's history were reviewed and updated as appropriate: allergies, current medications, past family history, past medical history, past social history, past surgical history and problem list.    Social and Family and Surgical History reviewed and updated today, see Rooming tab.    Health History, Preventive Measures and Vaccination flow sheets reviewed and updated today.    Patient's current medical chart in Epic; including previous office notes, imaging, labs, specialist's evaluation either in notes or in Media tab reviewed today.    Other pertinent medical information also reviewed thru Care Everywhere function is also reviewed today.    Review of Systems  Review of Systems  A comprehensive review of systems was negative except for: Behavioral/Psych: positive for aggressive behavior, behavior problems, learning difficulty, mood swings, separation anxiety, sleep disturbance and schizophrinia    Vitals:    09/10/18 1056   BP: 124/84   BP Location: Left arm   Patient Position: Sitting   Pulse: 85   SpO2: 96%   Weight: 128 kg (282 lb)   Height: 182.9 cm (72\")       General Appearance:  Alert, cooperative, no distress, appears stated age.  Leg shakes, makes fair eye contact  Denies SI or HI no voices     Head:  Normocephalic, without obvious abnormality, atraumatic   Eyes:  PERRL, conjunctiva/corneas clear, EOM's intact.   Ears:  Normal TM's and external ear canals, both ears   Nose: Nares normal, septum midline, mucosa normal, no drainage or sinus tenderness   Throat: Lips, mucosa, and tongue normal; teeth and gums normal   Neck: Supple, symmetrical, trachea " midline, no adenopathy;   thyroid: No enlargement/tenderness/nodules; no carotid  bruit   Back:  Symmetric, no curvature, ROM normal, no CVA tenderness   Lungs:  Clear to auscultation bilaterally, respirations unlabored   Chest wall:  No tenderness or deformity   Heart:  Regular rate and rhythm, S1 and S2 normal, no murmur, rub or gallop   Abdomen:  Soft, non-tender, bowel sounds active all four quadrants,   no masses, no organomegaly   Rectal:        Extremities: Extremities normal, atraumatic, no cyanosis or edema   Pulses: 2+ and symmetric all extremities   Skin: Skin color, texture, turgor normal, no rashes or lesions   Lymph nodes: Cervical, supraclavicular, normal   Neurologic: CNII-XII intact. Normal strength, sensation and reflexes   throughout          Results for orders placed or performed in visit on 04/07/18   Lipid Panel   Result Value Ref Range    Total Cholesterol 150 0 - 200 mg/dL    Triglycerides 191 (H) 0 - 150 mg/dL    HDL Cholesterol 32 (L) 40 - 60 mg/dL    VLDL Cholesterol 38.2 (H) 8 - 32 mg/dL    LDL Cholesterol  80 0 - 100 mg/dL   CBC (No Diff)   Result Value Ref Range    WBC 9.96 4.80 - 10.80 10*3/mm3    RBC 5.45 4.70 - 6.10 10*6/mm3    Hemoglobin 15.5 14.0 - 18.0 g/dL    Hematocrit 46.9 42.0 - 52.0 %    MCV 86.1 80.0 - 94.0 fL    MCH 28.4 27.0 - 31.0 pg    MCHC 33.0 31.0 - 37.0 g/dL    RDW 12.1 11.5 - 14.5 %    Platelets 289 140 - 500 10*3/mm3   Comprehensive Metabolic Panel   Result Value Ref Range    Glucose 102 (H) 65 - 99 mg/dL    BUN 12 6 - 20 mg/dL    Creatinine 0.89 0.76 - 1.27 mg/dL    eGFR Non African Am 97 >60 mL/min/1.73    eGFR African Am 117 >60 mL/min/1.73    BUN/Creatinine Ratio 13.5 7.0 - 25.0    Sodium 139 136 - 145 mmol/L    Potassium 4.1 3.5 - 5.2 mmol/L    Chloride 100 98 - 107 mmol/L    Total CO2 25.8 22.0 - 29.0 mmol/L    Calcium 9.2 8.6 - 10.5 mg/dL    Total Protein 6.6 6.0 - 8.5 g/dL    Albumin 4.20 3.50 - 5.20 g/dL    Globulin 2.4 gm/dL    A/G Ratio 1.8 g/dL    Total  Bilirubin 0.6 0.2 - 1.2 mg/dL    Alkaline Phosphatase 56 40 - 129 U/L    AST (SGOT) 23 5 - 40 U/L    ALT (SGPT) 29 5 - 41 U/L   TSH   Result Value Ref Range    TSH 1.180 0.270 - 4.200 mIU/mL   Hemoglobin A1c   Result Value Ref Range    Hemoglobin A1C 5.30 4.80 - 5.60 %     Assessment/Plan   Healthy male exam.  Gibson was seen today for annual exam.    Diagnoses and all orders for this visit:    Hypertension due to endocrine disorder    Morbid drug-induced obesity    Routine adult health maintenance    Schizophrenia, paranoid type (CMS/HCC)  -     traZODone (DESYREL) 100 MG tablet; Take 1 tablet by mouth every night at bedtime.      1. Labs ok  Seems behavior is ok  Lives with parents    2. Patient Counseling:  --Nutrition: Stressed importance of moderation in sodium/caffeine intake, saturated fat and cholesterol.  Discussed caloric balance, sufficient intake of fresh fruits, vegetables, fiber, calcium, iron.  -  --Exercise: Stressed the importance of regular exercise.   --Substance Abuse: Discussed cessation/primary prevention of tobacco, alcohol, or other drug use; driving or other dangerous activities under the influence.    --Dental health: Discussed importance of regular tooth brushing, flossing, and dental visits.  -- suggested having eyes and vision checked if needed or past due.  --Immunizations reviewed.  --  3. Discussed the patient's BMI with him.  The BMI is above average; no BMI management plan is appropriate  4. Follow up in 6 months    There are no Patient Instructions on file for this visit.    Medications Discontinued During This Encounter   Medication Reason   • traZODone (DESYREL) 100 MG tablet Duplicate order   • traZODone (DESYREL) 100 MG tablet Reorder        Dr. Jadiel Hawley MD  [place], [place]  [facility]

## 2019-03-11 ENCOUNTER — OFFICE VISIT (OUTPATIENT)
Dept: FAMILY MEDICINE CLINIC | Facility: CLINIC | Age: 39
End: 2019-03-11

## 2019-03-11 VITALS
DIASTOLIC BLOOD PRESSURE: 80 MMHG | BODY MASS INDEX: 36.57 KG/M2 | HEART RATE: 94 BPM | OXYGEN SATURATION: 97 % | SYSTOLIC BLOOD PRESSURE: 118 MMHG | HEIGHT: 72 IN | WEIGHT: 270 LBS

## 2019-03-11 DIAGNOSIS — Z00.00 ROUTINE ADULT HEALTH MAINTENANCE: ICD-10-CM

## 2019-03-11 DIAGNOSIS — F20.0 SCHIZOPHRENIA, PARANOID TYPE (HCC): ICD-10-CM

## 2019-03-11 DIAGNOSIS — I15.2 HYPERTENSION DUE TO ENDOCRINE DISORDER: Primary | ICD-10-CM

## 2019-03-11 DIAGNOSIS — E66.1 MORBID DRUG-INDUCED OBESITY: ICD-10-CM

## 2019-03-11 DIAGNOSIS — Z79.899 HIGH RISK MEDICATION USE: ICD-10-CM

## 2019-03-11 PROCEDURE — 99213 OFFICE O/P EST LOW 20 MIN: CPT | Performed by: FAMILY MEDICINE

## 2019-03-11 RX ORDER — HYDROXYZINE 50 MG/1
50 TABLET, FILM COATED ORAL EVERY 6 HOURS PRN
Qty: 30 TABLET | Refills: 5 | Status: SHIPPED | OUTPATIENT
Start: 2019-03-11 | End: 2021-05-11 | Stop reason: SDUPTHER

## 2019-03-11 NOTE — PROGRESS NOTES
"  Chief Complaint   Patient presents with   • Follow-up   • Hypertension       Subjective     Patient here for follow-up of elevated blood pressure.    He is not exercising and is not adherent to a low-salt diet.    Blood pressure is well controlled at home.   Cardiac symptoms: none.   Patient denies: chest pain.   Cardiovascular risk factors: hypertension, male gender, obesity (BMI >= 30 kg/m2) and sedentary lifestyle.   Use of agents associated with hypertension: none.   History of target organ damage: none.  Patient is taking prescribed hypertension medications as prescribed without side effects.    He is stressed, his mother who he relies on for most everything was in hospital and now is in rehab unit.    Reports sleeping too much        The following portions of the patient's history were reviewed and updated as appropriate: allergies, current medications, past family history, past medical history, past social history, past surgical history and problem list.    Review of Systems  A comprehensive review of systems was negative except for: Behavioral/Psych: positive for behavior problems, learning difficulty, mood swings, separation anxiety, sleep disturbance and tobacco use         Vitals:    03/11/19 1051   BP: 118/80   BP Location: Left arm   Patient Position: Sitting   Pulse: 94   SpO2: 97%   Weight: 122 kg (270 lb)   Height: 182.9 cm (72\")     BP Readings from Last 3 Encounters:   03/11/19 118/80   09/10/18 124/84   07/12/18 130/80     Objective      Gen: alert, pleasant.  HEENT: PERRL, EOMI intact, lids ok, ear canals clear, TMs normal, throat clear, nostrils normal  Neck: no bruit, no enlarged thyroid  Lungs: CTA  Heart: RR no murmur  ABD: soft , + BS  Pulses: intact  Mood: stable, blunt affect     Assessment/Plan   Hypertension, normal blood pressure Evidence of target organ damage: none.    Gibson was seen today for follow-up and hypertension.    Diagnoses and all orders for this visit:    Hypertension " due to endocrine disorder  -     Lipid Panel; Future  -     CBC (No Diff); Future  -     Comprehensive Metabolic Panel; Future  -     TSH; Future  -     Hemoglobin A1c; Future    Morbid drug-induced obesity    Routine adult health maintenance  -     Lipid Panel; Future  -     CBC (No Diff); Future  -     Comprehensive Metabolic Panel; Future  -     TSH; Future  -     Hemoglobin A1c; Future    Schizophrenia, paranoid type (CMS/HCC)  -     hydrOXYzine (ATARAX) 50 MG tablet; Take 1 tablet by mouth Every 6 (Six) Hours As Needed for Anxiety.    High risk medication use  -     Lipid Panel; Future  -     CBC (No Diff); Future  -     Comprehensive Metabolic Panel; Future  -     TSH; Future  -     Hemoglobin A1c; Future      Medication: no change.  Follow up: 6 months and as needed.    There are no Patient Instructions on file for this visit.  Medications Discontinued During This Encounter   Medication Reason   • hydrOXYzine (ATARAX) 50 MG tablet Reorder        Return in about 6 months (around 9/11/2019).    Limit salt  Limit alcoholic drinks to 1 a day  Limit caffeine to 1-2 servings a day    Dr. Jadiel Hawley MD  [place], [place]  [facility].

## 2019-07-25 DIAGNOSIS — F20.0 SCHIZOPHRENIA, PARANOID TYPE (HCC): ICD-10-CM

## 2019-07-25 RX ORDER — RISPERIDONE 4 MG/1
TABLET ORAL
Qty: 60 TABLET | Refills: 0 | Status: SHIPPED | OUTPATIENT
Start: 2019-07-25 | End: 2019-08-23 | Stop reason: SDUPTHER

## 2019-08-11 ENCOUNTER — RESULTS ENCOUNTER (OUTPATIENT)
Dept: FAMILY MEDICINE CLINIC | Facility: CLINIC | Age: 39
End: 2019-08-11

## 2019-08-11 DIAGNOSIS — Z79.899 HIGH RISK MEDICATION USE: ICD-10-CM

## 2019-08-11 DIAGNOSIS — I15.2 HYPERTENSION DUE TO ENDOCRINE DISORDER: ICD-10-CM

## 2019-08-11 DIAGNOSIS — Z00.00 ROUTINE ADULT HEALTH MAINTENANCE: ICD-10-CM

## 2019-08-23 DIAGNOSIS — F20.0 SCHIZOPHRENIA, PARANOID TYPE (HCC): ICD-10-CM

## 2019-08-23 RX ORDER — RISPERIDONE 4 MG/1
TABLET ORAL
Qty: 60 TABLET | Refills: 0 | Status: SHIPPED | OUTPATIENT
Start: 2019-08-23 | End: 2019-09-26 | Stop reason: SDUPTHER

## 2019-09-26 DIAGNOSIS — F20.0 SCHIZOPHRENIA, PARANOID TYPE (HCC): ICD-10-CM

## 2019-09-26 RX ORDER — TRAZODONE HYDROCHLORIDE 100 MG/1
100 TABLET ORAL
Qty: 30 TABLET | Refills: 0 | Status: SHIPPED | OUTPATIENT
Start: 2019-09-26 | End: 2019-10-28 | Stop reason: SDUPTHER

## 2019-09-26 RX ORDER — RISPERIDONE 4 MG/1
TABLET ORAL
Qty: 60 TABLET | Refills: 0 | Status: SHIPPED | OUTPATIENT
Start: 2019-09-26 | End: 2019-10-28 | Stop reason: SDUPTHER

## 2019-10-23 DIAGNOSIS — F20.0 SCHIZOPHRENIA, PARANOID TYPE (HCC): ICD-10-CM

## 2019-10-23 RX ORDER — RISPERIDONE 4 MG/1
TABLET ORAL
Qty: 60 TABLET | Refills: 0 | OUTPATIENT
Start: 2019-10-23

## 2019-10-23 RX ORDER — TRAZODONE HYDROCHLORIDE 100 MG/1
100 TABLET ORAL
Qty: 30 TABLET | Refills: 0 | OUTPATIENT
Start: 2019-10-23

## 2019-10-28 ENCOUNTER — OFFICE VISIT (OUTPATIENT)
Dept: FAMILY MEDICINE CLINIC | Facility: CLINIC | Age: 39
End: 2019-10-28

## 2019-10-28 VITALS
WEIGHT: 249 LBS | OXYGEN SATURATION: 99 % | SYSTOLIC BLOOD PRESSURE: 120 MMHG | HEART RATE: 94 BPM | DIASTOLIC BLOOD PRESSURE: 84 MMHG | BODY MASS INDEX: 33.72 KG/M2 | HEIGHT: 72 IN

## 2019-10-28 DIAGNOSIS — F20.0 SCHIZOPHRENIA, PARANOID TYPE (HCC): Primary | ICD-10-CM

## 2019-10-28 PROCEDURE — 99213 OFFICE O/P EST LOW 20 MIN: CPT | Performed by: FAMILY MEDICINE

## 2019-10-28 RX ORDER — TRAZODONE HYDROCHLORIDE 100 MG/1
100 TABLET ORAL
Qty: 30 TABLET | Refills: 5 | Status: SHIPPED | OUTPATIENT
Start: 2019-10-28 | End: 2020-04-27

## 2019-10-28 RX ORDER — RISPERIDONE 4 MG/1
4 TABLET ORAL 2 TIMES DAILY
Qty: 60 TABLET | Refills: 5 | Status: SHIPPED | OUTPATIENT
Start: 2019-10-28 | End: 2020-04-29

## 2019-10-28 NOTE — PROGRESS NOTES
Subjective   Gibson Molina is a 39 y.o. male who is here for   Chief Complaint   Patient presents with   • Med Refill     all medicines    • Schizoaffective Disorder   .     History of Present Illness   Not doing well  His mother  of breast cancer  She was his care taker  Still living with his step dad.  I s/w his step dad    The following portions of the patient's history were reviewed and updated as appropriate: allergies, current medications, past family history, past medical history, past social history, past surgical history and problem list.    Review of Systems    Objective   Physical Exam   Constitutional: He appears well-developed and well-nourished.   Cardiovascular: Normal rate.   Pulmonary/Chest: Effort normal.   Psychiatric: His affect is blunt. His speech is delayed. He is slowed. Thought content is paranoid. He expresses inappropriate judgment.   Nursing note and vitals reviewed.      Assessment/Plan   Gibson was seen today for med refill and schizoaffective disorder.    Diagnoses and all orders for this visit:    Schizophrenia, paranoid type (CMS/HCC)  -     risperiDONE (risperDAL) 4 MG tablet; Take 1 tablet by mouth 2 (Two) Times a Day.  -     traZODone (DESYREL) 100 MG tablet; Take 1 tablet by mouth every night at bedtime.    needs refills    There are no Patient Instructions on file for this visit.    Medications Discontinued During This Encounter   Medication Reason   • hydrochlorothiazide (HYDRODIURIL) 50 MG tablet *Therapy completed   • benztropine (COGENTIN) 1 MG tablet *Therapy completed   • risperiDONE (risperDAL) 4 MG tablet Reorder   • traZODone (DESYREL) 100 MG tablet Reorder        Return in about 6 months (around 2020).    Dr. Jadiel Hawley  [place], [place]

## 2020-04-27 DIAGNOSIS — F20.0 SCHIZOPHRENIA, PARANOID TYPE (HCC): ICD-10-CM

## 2020-04-27 RX ORDER — TRAZODONE HYDROCHLORIDE 100 MG/1
100 TABLET ORAL
Qty: 30 TABLET | Refills: 5 | Status: SHIPPED | OUTPATIENT
Start: 2020-04-27 | End: 2020-04-29

## 2020-04-29 DIAGNOSIS — F20.0 SCHIZOPHRENIA, PARANOID TYPE (HCC): ICD-10-CM

## 2020-04-29 RX ORDER — TRAZODONE HYDROCHLORIDE 100 MG/1
100 TABLET ORAL
Qty: 30 TABLET | Refills: 5 | Status: SHIPPED | OUTPATIENT
Start: 2020-04-29 | End: 2021-05-11 | Stop reason: SDUPTHER

## 2020-04-29 RX ORDER — RISPERIDONE 4 MG/1
TABLET ORAL
Qty: 60 TABLET | Refills: 5 | Status: SHIPPED | OUTPATIENT
Start: 2020-04-29 | End: 2020-06-08

## 2020-06-07 DIAGNOSIS — F20.0 SCHIZOPHRENIA, PARANOID TYPE (HCC): ICD-10-CM

## 2020-06-08 RX ORDER — RISPERIDONE 4 MG/1
TABLET ORAL
Qty: 60 TABLET | Refills: 5 | Status: SHIPPED | OUTPATIENT
Start: 2020-06-08 | End: 2021-05-11 | Stop reason: SDUPTHER

## 2021-05-06 DIAGNOSIS — F20.0 SCHIZOPHRENIA, PARANOID TYPE (HCC): ICD-10-CM

## 2021-05-06 RX ORDER — RISPERIDONE 4 MG/1
TABLET ORAL
Qty: 60 TABLET | Refills: 5 | OUTPATIENT
Start: 2021-05-06

## 2021-05-10 DIAGNOSIS — F20.0 SCHIZOPHRENIA, PARANOID TYPE (HCC): ICD-10-CM

## 2021-05-10 RX ORDER — RISPERIDONE 4 MG/1
TABLET ORAL
Qty: 60 TABLET | Refills: 5 | OUTPATIENT
Start: 2021-05-10

## 2021-05-11 ENCOUNTER — OFFICE VISIT (OUTPATIENT)
Dept: FAMILY MEDICINE CLINIC | Facility: CLINIC | Age: 41
End: 2021-05-11

## 2021-05-11 VITALS
WEIGHT: 260 LBS | TEMPERATURE: 97.8 F | DIASTOLIC BLOOD PRESSURE: 84 MMHG | HEART RATE: 87 BPM | SYSTOLIC BLOOD PRESSURE: 148 MMHG | OXYGEN SATURATION: 100 % | HEIGHT: 72 IN | BODY MASS INDEX: 35.21 KG/M2

## 2021-05-11 DIAGNOSIS — F20.0 SCHIZOPHRENIA, PARANOID TYPE (HCC): ICD-10-CM

## 2021-05-11 PROCEDURE — 99213 OFFICE O/P EST LOW 20 MIN: CPT | Performed by: FAMILY MEDICINE

## 2021-05-11 RX ORDER — TRAZODONE HYDROCHLORIDE 100 MG/1
100 TABLET ORAL
Qty: 30 TABLET | Refills: 5 | Status: SHIPPED | OUTPATIENT
Start: 2021-05-11 | End: 2021-11-09 | Stop reason: SDUPTHER

## 2021-05-11 RX ORDER — HYDROXYZINE 50 MG/1
50 TABLET, FILM COATED ORAL EVERY 6 HOURS PRN
Qty: 30 TABLET | Refills: 5 | Status: SHIPPED | OUTPATIENT
Start: 2021-05-11 | End: 2021-11-09 | Stop reason: SDUPTHER

## 2021-05-11 RX ORDER — RISPERIDONE 4 MG/1
4 TABLET ORAL 2 TIMES DAILY
Qty: 60 TABLET | Refills: 5 | Status: SHIPPED | OUTPATIENT
Start: 2021-05-11 | End: 2021-11-09 | Stop reason: SDUPTHER

## 2021-05-11 NOTE — PROGRESS NOTES
"  Subjective   Gibson Molina is a 41 y.o. male who is here for   Chief Complaint   Patient presents with   • Hypertension   • Schizophrenia   .     History of Present Illness   Sukhdev comes in for follow-up.  Sukhdev's been lost to follow-up for about a year and a half for a multitude of issues.  Mostly being COVID-19.  His stepfather brought him in today.  Sukhdev reports he is doing okay.  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  Doing well  Sleeping okay.  Does not report bad dreams.  Not hearing voices.  Is out of his medications due for refills.  Reports he received his COVID-19 vaccine, documentation is pending    The following portions of the patient's history were reviewed and updated as appropriate: allergies, current medications, past family history, past medical history, past social history, past surgical history and problem list.    Review of Systems    Objective   Vitals:    05/11/21 1243   BP: 148/84   BP Location: Left arm   Patient Position: Sitting   Cuff Size: Adult   Pulse: 87   Temp: 97.8 °F (36.6 °C)   TempSrc: Temporal   SpO2: 100%   Weight: 118 kg (260 lb)   Height: 182.9 cm (72\")      Physical Exam  Neurological:      Mental Status: He is alert.   Psychiatric:         Mood and Affect: Affect is flat.         Speech: Speech is delayed.         Behavior: Behavior is slowed.         Cognition and Memory: Cognition is impaired. Memory is impaired.         Assessment/Plan   Diagnoses and all orders for this visit:    1. Schizophrenia, paranoid type (CMS/HCC)  -     risperiDONE (risperDAL) 4 MG tablet; Take 1 tablet by mouth 2 (Two) Times a Day.  Dispense: 60 tablet; Refill: 5  -     traZODone (DESYREL) 100 MG tablet; Take 1 tablet by mouth every night at bedtime. Indications: Aggressive Behavior, Trouble Sleeping  Dispense: 30 tablet; Refill: 5  -     hydrOXYzine (ATARAX) 50 MG tablet; Take 1 tablet by mouth Every 6 (Six) Hours As Needed for Anxiety.  Dispense: 30 tablet; Refill: 5    Restart " his medications and asked him come back in 6 months  There are no Patient Instructions on file for this visit.    Medications Discontinued During This Encounter   Medication Reason   • traZODone (DESYREL) 100 MG tablet Reorder   • risperiDONE (risperDAL) 4 MG tablet Reorder   • hydrOXYzine (ATARAX) 50 MG tablet Reorder        Return in about 6 months (around 11/11/2021).    Dr. Jadiel Hawley  [place], [place]

## 2021-09-09 DIAGNOSIS — F20.0 SCHIZOPHRENIA, PARANOID TYPE (HCC): ICD-10-CM

## 2021-09-10 RX ORDER — TRAZODONE HYDROCHLORIDE 100 MG/1
TABLET ORAL
Qty: 30 TABLET | Refills: 5 | OUTPATIENT
Start: 2021-09-10

## 2021-11-09 ENCOUNTER — OFFICE VISIT (OUTPATIENT)
Dept: FAMILY MEDICINE CLINIC | Facility: CLINIC | Age: 41
End: 2021-11-09

## 2021-11-09 VITALS
WEIGHT: 266 LBS | OXYGEN SATURATION: 99 % | HEART RATE: 86 BPM | DIASTOLIC BLOOD PRESSURE: 80 MMHG | SYSTOLIC BLOOD PRESSURE: 122 MMHG | HEIGHT: 72 IN | BODY MASS INDEX: 36.03 KG/M2 | TEMPERATURE: 97.3 F

## 2021-11-09 DIAGNOSIS — F20.0 SCHIZOPHRENIA, PARANOID TYPE (HCC): ICD-10-CM

## 2021-11-09 PROCEDURE — 99213 OFFICE O/P EST LOW 20 MIN: CPT | Performed by: FAMILY MEDICINE

## 2021-11-09 RX ORDER — HYDROXYZINE 50 MG/1
50 TABLET, FILM COATED ORAL
Qty: 90 TABLET | Refills: 1 | Status: SHIPPED | OUTPATIENT
Start: 2021-11-09 | End: 2022-05-02 | Stop reason: SDUPTHER

## 2021-11-09 RX ORDER — TRAZODONE HYDROCHLORIDE 100 MG/1
100 TABLET ORAL
Qty: 30 TABLET | Refills: 5 | Status: SHIPPED | OUTPATIENT
Start: 2021-11-09 | End: 2022-05-02 | Stop reason: SDUPTHER

## 2021-11-09 RX ORDER — RISPERIDONE 4 MG/1
4 TABLET ORAL 2 TIMES DAILY
Qty: 60 TABLET | Refills: 5 | Status: SHIPPED | OUTPATIENT
Start: 2021-11-09 | End: 2022-05-02 | Stop reason: SDUPTHER

## 2021-11-09 NOTE — PROGRESS NOTES
"  Subjective   Gibson Molina is a 41 y.o. male who is here for   Chief Complaint   Patient presents with   • Schizophrenia     medication f/u    .     History of Present Illness   Sukhdev is here in follow-up for his schizophrenia medications.  No reports from family.  Note that his mother  2 to 3 years ago from breast cancer.  No contact with his father.  Lives with his stepfather.  He has 1 brother who is in halfway for bank robbery  Has little contact with his other sister.  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      The following portions of the patient's history were reviewed and updated as appropriate: allergies, current medications, past family history, past medical history, past social history, past surgical history and problem list.    Review of Systems    Objective   Vitals:    21 0843   BP: 122/80   BP Location: Left arm   Patient Position: Sitting   Cuff Size: Large Adult   Pulse: 86   Temp: 97.3 °F (36.3 °C)   TempSrc: Temporal   SpO2: 99%   Weight: 121 kg (266 lb)   Height: 182.9 cm (72\")      Physical Exam  Cardiovascular:      Rate and Rhythm: Normal rate.   Neurological:      Mental Status: He is alert.   Psychiatric:         Mood and Affect: Affect is flat.         Assessment/Plan   Diagnoses and all orders for this visit:    1. Schizophrenia, paranoid type (HCC)  -     hydrOXYzine (ATARAX) 50 MG tablet; Take 1 tablet by mouth every night at bedtime. Indications: Feeling Anxious  Dispense: 90 tablet; Refill: 1  -     risperiDONE (risperDAL) 4 MG tablet; Take 1 tablet by mouth 2 (Two) Times a Day.  Dispense: 60 tablet; Refill: 5  -     traZODone (DESYREL) 100 MG tablet; Take 1 tablet by mouth every night at bedtime. Indications: Aggressive Behavior, Trouble Sleeping  Dispense: 30 tablet; Refill: 5    Will refill same medications.  We will see him back in 6 months    There are no Patient Instructions on file for this " visit.    Medications Discontinued During This Encounter   Medication Reason   • risperiDONE (risperDAL) 4 MG tablet Reorder   • traZODone (DESYREL) 100 MG tablet Reorder   • hydrOXYzine (ATARAX) 50 MG tablet Reorder        No follow-ups on file.    Dr. Jadiel Hawley  [place], [place]

## 2022-05-02 ENCOUNTER — OFFICE VISIT (OUTPATIENT)
Dept: FAMILY MEDICINE CLINIC | Facility: CLINIC | Age: 42
End: 2022-05-02

## 2022-05-02 VITALS
WEIGHT: 280 LBS | TEMPERATURE: 97.8 F | HEIGHT: 72 IN | SYSTOLIC BLOOD PRESSURE: 128 MMHG | HEART RATE: 88 BPM | OXYGEN SATURATION: 99 % | DIASTOLIC BLOOD PRESSURE: 88 MMHG | BODY MASS INDEX: 37.93 KG/M2

## 2022-05-02 DIAGNOSIS — F20.0 SCHIZOPHRENIA, PARANOID TYPE: ICD-10-CM

## 2022-05-02 PROCEDURE — 99213 OFFICE O/P EST LOW 20 MIN: CPT | Performed by: FAMILY MEDICINE

## 2022-05-02 RX ORDER — RISPERIDONE 4 MG/1
4 TABLET ORAL 2 TIMES DAILY
Qty: 60 TABLET | Refills: 5 | Status: SHIPPED | OUTPATIENT
Start: 2022-05-02 | End: 2022-10-21 | Stop reason: SDUPTHER

## 2022-05-02 RX ORDER — HYDROXYZINE 50 MG/1
50 TABLET, FILM COATED ORAL
Qty: 90 TABLET | Refills: 1 | Status: SHIPPED | OUTPATIENT
Start: 2022-05-02 | End: 2022-10-21 | Stop reason: SDUPTHER

## 2022-05-02 RX ORDER — TRAZODONE HYDROCHLORIDE 100 MG/1
100 TABLET ORAL
Qty: 30 TABLET | Refills: 5 | Status: SHIPPED | OUTPATIENT
Start: 2022-05-02 | End: 2022-10-21 | Stop reason: SDUPTHER

## 2022-05-02 NOTE — PROGRESS NOTES
"  Subjective   Gibson Molina is a 42 y.o. male who is here for   Chief Complaint   Patient presents with   • Schizophrenia     Medications follow up    .     History of Present Illness   Patient comes in for her usual 6-month medication check.  Longstanding schizophrenia.  Remains at home living with family.  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  Takes the hydroxyzine and trazodone for sleep at night  Seems to be tolerating the respite all well.      The following portions of the patient's history were reviewed and updated as appropriate: allergies, current medications, past family history, past medical history, past social history, past surgical history and problem list.    Review of Systems    Objective   Vitals:    05/02/22 1524   BP: 128/88   BP Location: Left arm   Patient Position: Sitting   Cuff Size: Large Adult   Pulse: 88   Temp: 97.8 °F (36.6 °C)   SpO2: 99%   Weight: 127 kg (280 lb)   Height: 182.9 cm (72\")      Physical Exam  Vitals reviewed.   Neurological:      Mental Status: He is alert.   Psychiatric:         Mood and Affect: Affect is blunt.         Assessment/Plan   Diagnoses and all orders for this visit:    1. Schizophrenia, paranoid type (HCC)  -     hydrOXYzine (ATARAX) 50 MG tablet; Take 1 tablet by mouth every night at bedtime. Indications: Feeling Anxious  Dispense: 90 tablet; Refill: 1  -     risperiDONE (risperDAL) 4 MG tablet; Take 1 tablet by mouth 2 (Two) Times a Day.  Dispense: 60 tablet; Refill: 5  -     traZODone (DESYREL) 100 MG tablet; Take 1 tablet by mouth every night at bedtime. Indications: Aggressive Behavior, Trouble Sleeping  Dispense: 30 tablet; Refill: 5    Refill medications 6 months.  There are no Patient Instructions on file for this visit.    Medications Discontinued During This Encounter   Medication Reason   • hydrOXYzine (ATARAX) 50 MG tablet Reorder   • risperiDONE (risperDAL) 4 MG tablet Reorder   • traZODone (DESYREL) 100 MG tablet Reorder        No " follow-ups on file.    Dr. Jadiel Hawley  [place], [place]

## 2022-10-21 ENCOUNTER — OFFICE VISIT (OUTPATIENT)
Dept: FAMILY MEDICINE CLINIC | Facility: CLINIC | Age: 42
End: 2022-10-21

## 2022-10-21 VITALS
SYSTOLIC BLOOD PRESSURE: 130 MMHG | TEMPERATURE: 98.9 F | OXYGEN SATURATION: 94 % | DIASTOLIC BLOOD PRESSURE: 80 MMHG | WEIGHT: 265 LBS | BODY MASS INDEX: 35.89 KG/M2 | HEIGHT: 72 IN | HEART RATE: 114 BPM

## 2022-10-21 DIAGNOSIS — F20.0 SCHIZOPHRENIA, PARANOID TYPE: ICD-10-CM

## 2022-10-21 PROCEDURE — 99213 OFFICE O/P EST LOW 20 MIN: CPT | Performed by: FAMILY MEDICINE

## 2022-10-21 RX ORDER — RISPERIDONE 4 MG/1
4 TABLET ORAL 2 TIMES DAILY
Qty: 60 TABLET | Refills: 5 | Status: SHIPPED | OUTPATIENT
Start: 2022-10-21

## 2022-10-21 RX ORDER — TRAZODONE HYDROCHLORIDE 100 MG/1
100 TABLET ORAL
Qty: 30 TABLET | Refills: 5 | Status: SHIPPED | OUTPATIENT
Start: 2022-10-21

## 2022-10-21 RX ORDER — HYDROXYZINE 50 MG/1
50 TABLET, FILM COATED ORAL
Qty: 90 TABLET | Refills: 1 | Status: SHIPPED | OUTPATIENT
Start: 2022-10-21

## 2022-10-21 NOTE — PROGRESS NOTES
"  Subjective   Gibson Molina is a 42 y.o. male who is here for   Chief Complaint   Patient presents with   • Schizophrenia     Med f/u    .     History of Present Illness   Mood is stable  Stays home  Sleeping ok  Watches TV  No pain attacks  No voices    The following portions of the patient's history were reviewed and updated as appropriate: allergies, current medications, past family history, past medical history, past social history, past surgical history and problem list.    Review of Systems    Objective   Vitals:    10/21/22 1447   BP: 130/80   BP Location: Left arm   Patient Position: Sitting   Cuff Size: Large Adult   Pulse: 114   Temp: 98.9 °F (37.2 °C)   SpO2: 94%   Weight: 120 kg (265 lb)   Height: 182.9 cm (72\")      Physical Exam  Vitals reviewed.   Cardiovascular:      Rate and Rhythm: Normal rate.   Pulmonary:      Effort: Pulmonary effort is normal.   Neurological:      Mental Status: He is alert.   Psychiatric:         Mood and Affect: Affect is blunt and flat.         Speech: Speech is delayed.         Behavior: Behavior is slowed.         Assessment & Plan   Diagnoses and all orders for this visit:    1. Schizophrenia, paranoid type (HCC)  -     hydrOXYzine (ATARAX) 50 MG tablet; Take 1 tablet by mouth every night at bedtime. Indications: Feeling Anxious  Dispense: 90 tablet; Refill: 1  -     risperiDONE (risperDAL) 4 MG tablet; Take 1 tablet by mouth 2 (Two) Times a Day.  Dispense: 60 tablet; Refill: 5  -     traZODone (DESYREL) 100 MG tablet; Take 1 tablet by mouth every night at bedtime. Indications: Aggressive Behavior, Trouble Sleeping  Dispense: 30 tablet; Refill: 5      There are no Patient Instructions on file for this visit.    Medications Discontinued During This Encounter   Medication Reason   • hydrOXYzine (ATARAX) 50 MG tablet Reorder   • risperiDONE (risperDAL) 4 MG tablet Reorder   • traZODone (DESYREL) 100 MG tablet Reorder        Return in about 6 months (around " 4/21/2023).    Dr. Jadiel Hawley  [place], [place]

## 2023-04-21 ENCOUNTER — OFFICE VISIT (OUTPATIENT)
Dept: FAMILY MEDICINE CLINIC | Facility: CLINIC | Age: 43
End: 2023-04-21
Payer: MEDICAID

## 2023-04-21 VITALS
SYSTOLIC BLOOD PRESSURE: 146 MMHG | HEIGHT: 72 IN | OXYGEN SATURATION: 98 % | BODY MASS INDEX: 37.11 KG/M2 | DIASTOLIC BLOOD PRESSURE: 90 MMHG | WEIGHT: 274 LBS | HEART RATE: 86 BPM | TEMPERATURE: 98 F

## 2023-04-21 DIAGNOSIS — F20.0 SCHIZOPHRENIA, PARANOID TYPE: ICD-10-CM

## 2023-04-21 PROCEDURE — 1159F MED LIST DOCD IN RCRD: CPT | Performed by: FAMILY MEDICINE

## 2023-04-21 PROCEDURE — 99213 OFFICE O/P EST LOW 20 MIN: CPT | Performed by: FAMILY MEDICINE

## 2023-04-21 PROCEDURE — 1160F RVW MEDS BY RX/DR IN RCRD: CPT | Performed by: FAMILY MEDICINE

## 2023-04-21 RX ORDER — HYDROXYZINE 50 MG/1
50 TABLET, FILM COATED ORAL
Qty: 90 TABLET | Refills: 1 | Status: SHIPPED | OUTPATIENT
Start: 2023-04-21

## 2023-04-21 RX ORDER — TRAZODONE HYDROCHLORIDE 100 MG/1
100 TABLET ORAL
Qty: 30 TABLET | Refills: 5 | Status: SHIPPED | OUTPATIENT
Start: 2023-04-21

## 2023-04-21 RX ORDER — RISPERIDONE 4 MG/1
4 TABLET ORAL 2 TIMES DAILY
Qty: 60 TABLET | Refills: 5 | Status: SHIPPED | OUTPATIENT
Start: 2023-04-21

## 2023-04-21 NOTE — PROGRESS NOTES
"  Subjective   Gibson Molina is a 43 y.o. male who is here for No chief complaint on file.  .     History of Present Illness   Sukhdev is here to follow-up on his schizophrenia.  No reports from family that he has had abnormal behavior.  His stepfather is now his guardian.  As his mother has passed away reports he enjoys working for his [email or web address].  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  Says he enjoys spending money on closed foods on that and drink and cigarettes      The following portions of the patient's history were reviewed and updated as appropriate: allergies, current medications, past family history, past medical history, past social history, past surgical history and problem list.    Review of Systems    Objective   Vitals:    04/21/23 1507   BP: 146/90   Pulse: 86   Temp: 98 °F (36.7 °C)   SpO2: 98%   Weight: 124 kg (274 lb)   Height: 182.9 cm (72\")      Physical Exam  Vitals reviewed.   Cardiovascular:      Rate and Rhythm: Normal rate.   Pulmonary:      Effort: Pulmonary effort is normal.   Neurological:      Mental Status: He is alert.   Psychiatric:         Mood and Affect: Affect is blunt.         Behavior: Behavior is cooperative.         Assessment & Plan   Diagnoses and all orders for this visit:    1. Schizophrenia, paranoid type  -     hydrOXYzine (ATARAX) 50 MG tablet; Take 1 tablet by mouth every night at bedtime. Indications: Feeling Anxious  Dispense: 90 tablet; Refill: 1  -     risperiDONE (risperDAL) 4 MG tablet; Take 1 tablet by mouth 2 (Two) Times a Day.  Dispense: 60 tablet; Refill: 5  -     traZODone (DESYREL) 100 MG tablet; Take 1 tablet by mouth every night at bedtime. Indications: Aggressive Behavior, Trouble Sleeping  Dispense: 30 tablet; Refill: 5      There are no Patient Instructions on file for this visit.    Medications Discontinued During This Encounter   Medication Reason   • hydrOXYzine (ATARAX) 50 MG tablet Reorder   • " risperiDONE (risperDAL) 4 MG tablet Reorder   • traZODone (DESYREL) 100 MG tablet Reorder        No follow-ups on file.    Dr. Jadiel Hawley  [place], [place]

## 2023-10-30 ENCOUNTER — OFFICE VISIT (OUTPATIENT)
Dept: FAMILY MEDICINE CLINIC | Facility: CLINIC | Age: 43
End: 2023-10-30
Payer: MEDICAID

## 2023-10-30 VITALS
OXYGEN SATURATION: 99 % | BODY MASS INDEX: 35.62 KG/M2 | DIASTOLIC BLOOD PRESSURE: 80 MMHG | HEART RATE: 109 BPM | TEMPERATURE: 97.3 F | SYSTOLIC BLOOD PRESSURE: 120 MMHG | WEIGHT: 263 LBS | HEIGHT: 72 IN

## 2023-10-30 DIAGNOSIS — F20.0 SCHIZOPHRENIA, PARANOID TYPE: ICD-10-CM

## 2023-10-30 PROCEDURE — 1159F MED LIST DOCD IN RCRD: CPT | Performed by: FAMILY MEDICINE

## 2023-10-30 PROCEDURE — 99213 OFFICE O/P EST LOW 20 MIN: CPT | Performed by: FAMILY MEDICINE

## 2023-10-30 PROCEDURE — 1160F RVW MEDS BY RX/DR IN RCRD: CPT | Performed by: FAMILY MEDICINE

## 2023-10-30 RX ORDER — RISPERIDONE 4 MG/1
4 TABLET ORAL 2 TIMES DAILY
Qty: 60 TABLET | Refills: 5 | Status: SHIPPED | OUTPATIENT
Start: 2023-10-30

## 2023-10-30 RX ORDER — TRAZODONE HYDROCHLORIDE 100 MG/1
100 TABLET ORAL
Qty: 30 TABLET | Refills: 5 | Status: SHIPPED | OUTPATIENT
Start: 2023-10-30

## 2023-10-30 RX ORDER — HYDROXYZINE 50 MG/1
50 TABLET, FILM COATED ORAL
Qty: 90 TABLET | Refills: 1 | Status: SHIPPED | OUTPATIENT
Start: 2023-10-30

## 2023-10-30 NOTE — PROGRESS NOTES
"  Subjective   Gibson Molina is a 43 y.o. male who is here for   Chief Complaint   Patient presents with    Schizophrenia     Follow up    .     History of Present Illness     Follow-up on schizophrenia.  Patient alone.  Reports he is feeling well.  Taking medications every day  Reports no depressive symptoms  Or thoughts of suicidal ideations.  Sleeping okay  Weight is stable.  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    The following portions of the patient's history were reviewed and updated as appropriate: allergies, current medications, past medical history, past social history, past surgical history, and problem list.    Review of Systems    Objective   Vitals:    10/30/23 1445   BP: 120/80   BP Location: Left arm   Patient Position: Sitting   Cuff Size: Adult   Pulse: 109   Temp: 97.3 °F (36.3 °C)   SpO2: 99%   Weight: 119 kg (263 lb)   Height: 182.9 cm (72\")      Physical Exam    Assessment & Plan   Diagnoses and all orders for this visit:    1. Schizophrenia, paranoid type  -     traZODone (DESYREL) 100 MG tablet; Take 1 tablet by mouth every night at bedtime. Indications: Aggressive Behavior, Trouble Sleeping  Dispense: 30 tablet; Refill: 5  -     risperiDONE (risperDAL) 4 MG tablet; Take 1 tablet by mouth 2 (Two) Times a Day. Indications: Schizophrenia  Dispense: 60 tablet; Refill: 5  -     hydrOXYzine (ATARAX) 50 MG tablet; Take 1 tablet by mouth every night at bedtime. Indications: Feeling Anxious  Dispense: 90 tablet; Refill: 1      There are no Patient Instructions on file for this visit.    Medications Discontinued During This Encounter   Medication Reason    hydrOXYzine (ATARAX) 50 MG tablet Reorder    risperiDONE (risperDAL) 4 MG tablet Reorder    traZODone (DESYREL) 100 MG tablet Reorder        No follow-ups on file.    Dr. Jadiel Hawley  [facility], [place]    "

## 2024-04-23 DIAGNOSIS — F20.0 SCHIZOPHRENIA, PARANOID TYPE: ICD-10-CM

## 2024-04-23 RX ORDER — RISPERIDONE 4 MG/1
4 TABLET ORAL 2 TIMES DAILY
Qty: 60 TABLET | Refills: 5 | Status: SHIPPED | OUTPATIENT
Start: 2024-04-23

## 2024-04-25 DIAGNOSIS — F20.0 SCHIZOPHRENIA, PARANOID TYPE: ICD-10-CM

## 2024-04-30 ENCOUNTER — OFFICE VISIT (OUTPATIENT)
Dept: FAMILY MEDICINE CLINIC | Facility: CLINIC | Age: 44
End: 2024-04-30
Payer: MEDICAID

## 2024-04-30 VITALS
BODY MASS INDEX: 33.72 KG/M2 | TEMPERATURE: 97.7 F | HEART RATE: 97 BPM | HEIGHT: 72 IN | SYSTOLIC BLOOD PRESSURE: 120 MMHG | WEIGHT: 249 LBS | DIASTOLIC BLOOD PRESSURE: 84 MMHG | OXYGEN SATURATION: 97 %

## 2024-04-30 DIAGNOSIS — F20.0 SCHIZOPHRENIA, PARANOID TYPE: ICD-10-CM

## 2024-04-30 PROCEDURE — 99213 OFFICE O/P EST LOW 20 MIN: CPT | Performed by: FAMILY MEDICINE

## 2024-04-30 RX ORDER — HYDROXYZINE 50 MG/1
50 TABLET, FILM COATED ORAL
Qty: 90 TABLET | Refills: 1 | Status: SHIPPED | OUTPATIENT
Start: 2024-04-30

## 2024-04-30 RX ORDER — TRAZODONE HYDROCHLORIDE 100 MG/1
100 TABLET ORAL
Qty: 30 TABLET | Refills: 5 | Status: SHIPPED | OUTPATIENT
Start: 2024-04-30

## 2024-04-30 NOTE — PROGRESS NOTES
"  Subjective   Gibson Molina is a 44 y.o. male who is here for   Chief Complaint   Patient presents with    Med Refill   .     History of Present Illness     Sukhdev is here in routine 6-month follow-up for his paranoid schizophrenia.  No reports of abnormal behavior medications working well; he is compliant    The following portions of the patient's history were reviewed and updated as appropriate: allergies, current medications, past family history, past medical history, past social history, past surgical history, and problem list.    Review of Systems    Objective   Vitals:    04/30/24 1451   BP: 120/84   Pulse: 97   Temp: 97.7 °F (36.5 °C)   SpO2: 97%   Weight: 113 kg (249 lb)   Height: 182.9 cm (72.01\")      Physical Exam  Vitals reviewed.   Neurological:      Mental Status: He is alert.   Psychiatric:         Mood and Affect: Affect is flat.         Speech: Speech is rapid and pressured.         Behavior: Behavior is slowed.         Assessment & Plan   Diagnoses and all orders for this visit:    1. Schizophrenia, paranoid type  -     hydrOXYzine (ATARAX) 50 MG tablet; Take 1 tablet by mouth every night at bedtime. Indications: Feeling Anxious  Dispense: 90 tablet; Refill: 1  -     traZODone (DESYREL) 100 MG tablet; Take 1 tablet by mouth every night at bedtime. Indications: Aggressive Behavior, Trouble Sleeping  Dispense: 30 tablet; Refill: 5      There are no Patient Instructions on file for this visit.    Medications Discontinued During This Encounter   Medication Reason    traZODone (DESYREL) 100 MG tablet Reorder    hydrOXYzine (ATARAX) 50 MG tablet Reorder        No follow-ups on file.    Dr. Jadiel Hawley  [place], [place]    "

## 2024-05-02 RX ORDER — TRAZODONE HYDROCHLORIDE 100 MG/1
100 TABLET ORAL
Qty: 30 TABLET | Refills: 5 | OUTPATIENT
Start: 2024-05-02

## 2024-10-29 ENCOUNTER — OFFICE VISIT (OUTPATIENT)
Dept: FAMILY MEDICINE CLINIC | Facility: CLINIC | Age: 44
End: 2024-10-29
Payer: MEDICAID

## 2024-10-29 VITALS
HEART RATE: 82 BPM | WEIGHT: 243 LBS | DIASTOLIC BLOOD PRESSURE: 82 MMHG | TEMPERATURE: 97.5 F | OXYGEN SATURATION: 97 % | HEIGHT: 72 IN | SYSTOLIC BLOOD PRESSURE: 120 MMHG | BODY MASS INDEX: 32.91 KG/M2

## 2024-10-29 DIAGNOSIS — F20.0 SCHIZOPHRENIA, PARANOID TYPE: ICD-10-CM

## 2024-10-29 PROCEDURE — 99396 PREV VISIT EST AGE 40-64: CPT | Performed by: FAMILY MEDICINE

## 2024-10-29 PROCEDURE — 1126F AMNT PAIN NOTED NONE PRSNT: CPT | Performed by: FAMILY MEDICINE

## 2024-10-29 RX ORDER — RISPERIDONE 4 MG/1
4 TABLET ORAL 2 TIMES DAILY
Qty: 60 TABLET | Refills: 5 | Status: SHIPPED | OUTPATIENT
Start: 2024-10-29

## 2024-10-29 RX ORDER — TRAZODONE HYDROCHLORIDE 100 MG/1
100 TABLET ORAL
Qty: 30 TABLET | Refills: 5 | Status: SHIPPED | OUTPATIENT
Start: 2024-10-29

## 2024-10-29 NOTE — PROGRESS NOTES
"  Subjective   Gibson Molina is a 44 y.o. male who is here for   Chief Complaint   Patient presents with   • Schizophrenia   .     History of Present Illness     The following portions of the patient's history were reviewed and updated as appropriate: allergies, current medications, past family history, past medical history, past social history, past surgical history, and problem list.    Review of Systems    Objective   Vitals:    10/29/24 1417   BP: 120/82   BP Location: Left arm   Patient Position: Sitting   Cuff Size: Adult   Pulse: 82   Temp: 97.5 °F (36.4 °C)   SpO2: 97%   Weight: 110 kg (243 lb)   Height: 182.9 cm (72.01\")      Physical Exam    Assessment & Plan   There are no diagnoses linked to this encounter.  There are no Patient Instructions on file for this visit.    There are no discontinued medications.     No follow-ups on file.    Dr. Jadiel Hawley  [place], [place]    "

## 2024-10-29 NOTE — PROGRESS NOTES
"  Chief Complaint   Patient presents with    Annual Exam       Subjective   Gibson Molina is a 44 y.o. male and is here for a yearly physical exam. The patient reports no problems.    Do you take any herbs or supplements that were not prescribed by a doctor? no. If so, these will be added to active medication list.    The following portions of the patient's history were reviewed and updated as appropriate: allergies, current medications, past family history, past medical history, past social history, past surgical history, and problem list.    Social and Family and Surgical History reviewed and updated today.    Health and Surgical History, Preventive Measures and Vaccination flow sheets reviewed and updated today.    Patient's current medical chart in Epic; including previous office notes, Mychart messages, recent phone calls, imaging, labs, specialist's evaluation either in notes or in Media tab reviewed today.    Other outside pertinent medical information also reviewed thru Care Everywhere function is also reviewed today.    Review of Systems  Review of Systems  Pertinent items are noted in HPI.    Vitals:    10/29/24 1417   BP: 120/82   BP Location: Left arm   Patient Position: Sitting   Cuff Size: Adult   Pulse: 82   Temp: 97.5 °F (36.4 °C)   SpO2: 97%   Weight: 110 kg (243 lb)   Height: 182.9 cm (72.01\")     Body mass index is 32.95 kg/m².  BMI is >= 30 and <35. (Class 1 Obesity). The following options were offered after discussion;: none (medical contraindication)          General Appearance:  Alert, cooperative, no distress, appears stated age   Head:  Normocephalic, without obvious abnormality, atraumatic   Eyes:  PERRL, conjunctiva/corneas clear, EOM's intact.   Ears:  Normal TM's and external ear canals, both ears   Nose: Nares normal, septum midline, mucosa normal, no drainage or sinus tenderness   Throat: Lips, mucosa, and tongue normal; teeth and gums viewed   Neck: Supple, symmetrical,  no " adenopathy;   thyroid: No enlargement/tenderness/nodules;   no carotidbruit   Back:  Symmetric, no curvature, ROM normal, no CVA tenderness   Lungs:  Clear to auscultation bilaterally, respirations unlabored   Chest wall:  No tenderness or deformity   Heart:  Regular rate and rhythm, S1 and S2 normal, no murmur.   Abdomen:  Soft, non-tender, bowel sounds active all four quadrants,   no masses, no organomegaly   Rectal:        Extremities: Extremities normal, atraumatic, no cyanosis or edema   Pulses: 2+ and symmetric all extremities   Skin: Skin color, texture, turgor normal, no rashes. No suspicious lesions   Lymph nodes: Cervical, supraclavicular, and axillary nodes normal   Neurologic: CNII-XII intact. Normal strength, sensation.            Assessment & Plan   Healthy male exam.  Diagnoses and all orders for this visit:    1. Schizophrenia, paranoid type  -     risperiDONE (risperDAL) 4 MG tablet; Take 1 tablet by mouth 2 (Two) Times a Day.  Dispense: 60 tablet; Refill: 5  -     traZODone (DESYREL) 100 MG tablet; Take 1 tablet by mouth every night at bedtime. Indications: Aggressive Behavior, Trouble Sleeping  Dispense: 30 tablet; Refill: 5      1.  Seems to be doing stable  Reports no bad dreams or nightmares  Denies any video or audio hallucinations  Reports taking his medications  Still living with his stepfather Hema  2. Patient Counseling:  --Nutrition: Stressed importance of moderation in: sodium, caffeine, , saturated fat and cholesterol.  Discussed: caloric balance, sufficient intake of fresh fruits, vegetables, fiber, calcium, iron.  --Exercise: Stressed the importance of regular exercise.   --Substance Abuse: Discussed cessation and or reduction of tobacco, alcohol, or other drug use; driving or other dangerous activities under the influence.    --Dental health: Discussed importance of regular tooth brushing, flossing, and dental visits.  --Suggested having eyes and vision checked if needed or past  due.  --  3. Discussed the patient's BMI with him.  The BMI is in the acceptable range  4. Follow up in 6 months    There are no Patient Instructions on file for this visit.    Medications Discontinued During This Encounter   Medication Reason    risperiDONE (risperDAL) 4 MG tablet Reorder    traZODone (DESYREL) 100 MG tablet Reorder        Dr. Jadiel Hawley MD  [place], [place]  [facility]

## 2025-04-10 DIAGNOSIS — F20.0 SCHIZOPHRENIA, PARANOID TYPE: ICD-10-CM

## 2025-04-10 RX ORDER — HYDROXYZINE HYDROCHLORIDE 50 MG/1
TABLET, FILM COATED ORAL
Qty: 90 TABLET | Refills: 1 | Status: SHIPPED | OUTPATIENT
Start: 2025-04-10

## 2025-05-06 ENCOUNTER — OFFICE VISIT (OUTPATIENT)
Dept: FAMILY MEDICINE CLINIC | Facility: CLINIC | Age: 45
End: 2025-05-06
Payer: MEDICAID

## 2025-05-06 VITALS
HEART RATE: 96 BPM | SYSTOLIC BLOOD PRESSURE: 112 MMHG | BODY MASS INDEX: 34.67 KG/M2 | HEIGHT: 72 IN | TEMPERATURE: 98 F | WEIGHT: 256 LBS | DIASTOLIC BLOOD PRESSURE: 76 MMHG | OXYGEN SATURATION: 97 %

## 2025-05-06 DIAGNOSIS — F20.0 SCHIZOPHRENIA, PARANOID TYPE: ICD-10-CM

## 2025-05-06 PROCEDURE — 1126F AMNT PAIN NOTED NONE PRSNT: CPT | Performed by: FAMILY MEDICINE

## 2025-05-06 PROCEDURE — 1160F RVW MEDS BY RX/DR IN RCRD: CPT | Performed by: FAMILY MEDICINE

## 2025-05-06 PROCEDURE — 99213 OFFICE O/P EST LOW 20 MIN: CPT | Performed by: FAMILY MEDICINE

## 2025-05-06 PROCEDURE — 1159F MED LIST DOCD IN RCRD: CPT | Performed by: FAMILY MEDICINE

## 2025-05-06 RX ORDER — TRAZODONE HYDROCHLORIDE 100 MG/1
100 TABLET ORAL
Qty: 30 TABLET | Refills: 5 | Status: SHIPPED | OUTPATIENT
Start: 2025-05-06

## 2025-05-06 RX ORDER — RISPERIDONE 4 MG/1
4 TABLET ORAL 2 TIMES DAILY
Qty: 60 TABLET | Refills: 5 | Status: SHIPPED | OUTPATIENT
Start: 2025-05-06

## 2025-05-06 NOTE — PROGRESS NOTES
"  Subjective   Gibson Molina is a 45 y.o. male who is here for   Chief Complaint   Patient presents with    Schizophrenia   .     History of Present Illness     Gibson is here in routine follow-up for schizophrenia.  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    Review of Systems    Objective   Vitals:    05/06/25 1016   BP: 112/76   BP Location: Left arm   Patient Position: Sitting   Cuff Size: Adult   Pulse: 96   Temp: 98 °F (36.7 °C)   SpO2: 97%   Weight: 116 kg (256 lb)   Height: 182.9 cm (72.01\")      Physical Exam  Vitals reviewed.   Neurological:      Mental Status: He is alert.   Psychiatric:         Mood and Affect: Mood normal.         Assessment & Plan   Diagnoses and all orders for this visit:    1. Schizophrenia, paranoid type  -     risperiDONE (risperDAL) 4 MG tablet; Take 1 tablet by mouth 2 (Two) Times a Day.  Dispense: 60 tablet; Refill: 5  -     traZODone (DESYREL) 100 MG tablet; Take 1 tablet by mouth every night at bedtime. Indications: Aggressive Behavior, Trouble Sleeping  Dispense: 30 tablet; Refill: 5      There are no Patient Instructions on file for this visit.    Medications Discontinued During This Encounter   Medication Reason    risperiDONE (risperDAL) 4 MG tablet Reorder    traZODone (DESYREL) 100 MG tablet Reorder        No follow-ups on file.    Dr. Jadiel Hawley  [place], [place]    "